# Patient Record
Sex: FEMALE | Race: WHITE | NOT HISPANIC OR LATINO | Employment: OTHER | URBAN - METROPOLITAN AREA
[De-identification: names, ages, dates, MRNs, and addresses within clinical notes are randomized per-mention and may not be internally consistent; named-entity substitution may affect disease eponyms.]

---

## 2018-03-22 ENCOUNTER — OFFICE VISIT CONVERTED (OUTPATIENT)
Dept: OTHER | Facility: HOSPITAL | Age: 68
End: 2018-03-22
Attending: INTERNAL MEDICINE

## 2018-04-19 ENCOUNTER — OFFICE VISIT CONVERTED (OUTPATIENT)
Dept: OTHER | Facility: HOSPITAL | Age: 68
End: 2018-04-19
Attending: INTERNAL MEDICINE

## 2018-05-17 ENCOUNTER — OFFICE VISIT CONVERTED (OUTPATIENT)
Dept: OTHER | Facility: HOSPITAL | Age: 68
End: 2018-05-17
Attending: INTERNAL MEDICINE

## 2018-06-14 ENCOUNTER — OFFICE VISIT CONVERTED (OUTPATIENT)
Dept: OTHER | Facility: HOSPITAL | Age: 68
End: 2018-06-14
Attending: INTERNAL MEDICINE

## 2018-07-12 ENCOUNTER — OFFICE VISIT CONVERTED (OUTPATIENT)
Dept: OTHER | Facility: HOSPITAL | Age: 68
End: 2018-07-12
Attending: INTERNAL MEDICINE

## 2018-08-09 ENCOUNTER — OFFICE VISIT CONVERTED (OUTPATIENT)
Dept: OTHER | Facility: HOSPITAL | Age: 68
End: 2018-08-09
Attending: INTERNAL MEDICINE

## 2018-09-20 ENCOUNTER — OFFICE VISIT CONVERTED (OUTPATIENT)
Dept: OTHER | Facility: HOSPITAL | Age: 68
End: 2018-09-20
Attending: INTERNAL MEDICINE

## 2018-10-25 ENCOUNTER — CONVERSION ENCOUNTER (OUTPATIENT)
Dept: OTHER | Facility: HOSPITAL | Age: 68
End: 2018-10-25

## 2018-10-25 ENCOUNTER — OFFICE VISIT CONVERTED (OUTPATIENT)
Dept: OTHER | Facility: HOSPITAL | Age: 68
End: 2018-10-25
Attending: INTERNAL MEDICINE

## 2018-11-29 ENCOUNTER — CONVERSION ENCOUNTER (OUTPATIENT)
Dept: OTHER | Facility: HOSPITAL | Age: 68
End: 2018-11-29

## 2018-12-20 ENCOUNTER — CONVERSION ENCOUNTER (OUTPATIENT)
Dept: OTHER | Facility: HOSPITAL | Age: 68
End: 2018-12-20

## 2019-01-17 ENCOUNTER — CONVERSION ENCOUNTER (OUTPATIENT)
Dept: OTHER | Facility: HOSPITAL | Age: 69
End: 2019-01-17

## 2019-02-06 ENCOUNTER — HOSPITAL ENCOUNTER (OUTPATIENT)
Dept: RADIATION ONCOLOGY | Facility: HOSPITAL | Age: 69
Discharge: HOME | End: 2019-02-06
Attending: RADIOLOGY

## 2019-02-14 ENCOUNTER — CONVERSION ENCOUNTER (OUTPATIENT)
Dept: OTHER | Facility: HOSPITAL | Age: 69
End: 2019-02-14

## 2019-03-14 ENCOUNTER — CONVERSION ENCOUNTER (OUTPATIENT)
Dept: OTHER | Facility: HOSPITAL | Age: 69
End: 2019-03-14

## 2019-04-11 ENCOUNTER — CONVERSION ENCOUNTER (OUTPATIENT)
Dept: OTHER | Facility: HOSPITAL | Age: 69
End: 2019-04-11

## 2019-04-25 ENCOUNTER — OFFICE VISIT CONVERTED (OUTPATIENT)
Dept: OTHER | Facility: HOSPITAL | Age: 69
End: 2019-04-25
Attending: INTERNAL MEDICINE

## 2019-05-16 ENCOUNTER — OFFICE VISIT CONVERTED (OUTPATIENT)
Dept: OTHER | Facility: HOSPITAL | Age: 69
End: 2019-05-16
Attending: INTERNAL MEDICINE

## 2019-06-13 ENCOUNTER — CONVERSION ENCOUNTER (OUTPATIENT)
Dept: OTHER | Facility: HOSPITAL | Age: 69
End: 2019-06-13

## 2019-07-18 ENCOUNTER — CONVERSION ENCOUNTER (OUTPATIENT)
Dept: OTHER | Facility: HOSPITAL | Age: 69
End: 2019-07-18

## 2019-08-14 ENCOUNTER — HOSPITAL ENCOUNTER (OUTPATIENT)
Dept: RADIATION ONCOLOGY | Facility: HOSPITAL | Age: 69
Discharge: HOME | End: 2019-08-14
Attending: RADIOLOGY

## 2019-08-15 ENCOUNTER — CONVERSION ENCOUNTER (OUTPATIENT)
Dept: OTHER | Facility: HOSPITAL | Age: 69
End: 2019-08-15

## 2019-09-12 ENCOUNTER — CONVERSION ENCOUNTER (OUTPATIENT)
Dept: OTHER | Facility: HOSPITAL | Age: 69
End: 2019-09-12

## 2019-10-10 ENCOUNTER — CONVERSION ENCOUNTER (OUTPATIENT)
Dept: OTHER | Facility: HOSPITAL | Age: 69
End: 2019-10-10

## 2019-11-07 ENCOUNTER — CONVERSION ENCOUNTER (OUTPATIENT)
Dept: OTHER | Facility: HOSPITAL | Age: 69
End: 2019-11-07

## 2019-12-05 ENCOUNTER — OFFICE VISIT CONVERTED (OUTPATIENT)
Dept: OTHER | Facility: HOSPITAL | Age: 69
End: 2019-12-05
Attending: INTERNAL MEDICINE

## 2020-01-09 ENCOUNTER — OFFICE VISIT CONVERTED (OUTPATIENT)
Dept: OTHER | Facility: HOSPITAL | Age: 70
End: 2020-01-09
Attending: INTERNAL MEDICINE

## 2020-02-06 ENCOUNTER — OFFICE VISIT CONVERTED (OUTPATIENT)
Dept: OTHER | Facility: HOSPITAL | Age: 70
End: 2020-02-06
Attending: INTERNAL MEDICINE

## 2020-03-05 ENCOUNTER — OFFICE VISIT CONVERTED (OUTPATIENT)
Dept: OTHER | Facility: HOSPITAL | Age: 70
End: 2020-03-05
Attending: INTERNAL MEDICINE

## 2020-04-02 ENCOUNTER — OFFICE VISIT CONVERTED (OUTPATIENT)
Dept: OTHER | Facility: HOSPITAL | Age: 70
End: 2020-04-02
Attending: INTERNAL MEDICINE

## 2020-04-30 ENCOUNTER — OFFICE VISIT CONVERTED (OUTPATIENT)
Dept: OTHER | Facility: HOSPITAL | Age: 70
End: 2020-04-30
Attending: INTERNAL MEDICINE

## 2020-06-25 ENCOUNTER — OFFICE VISIT CONVERTED (OUTPATIENT)
Dept: OTHER | Facility: HOSPITAL | Age: 70
End: 2020-06-25
Attending: INTERNAL MEDICINE

## 2020-07-23 ENCOUNTER — OFFICE VISIT CONVERTED (OUTPATIENT)
Dept: OTHER | Facility: HOSPITAL | Age: 70
End: 2020-07-23
Attending: INTERNAL MEDICINE

## 2020-08-20 ENCOUNTER — OFFICE VISIT CONVERTED (OUTPATIENT)
Dept: OTHER | Facility: HOSPITAL | Age: 70
End: 2020-08-20
Attending: INTERNAL MEDICINE

## 2020-09-25 ENCOUNTER — HOSPITAL ENCOUNTER (OUTPATIENT)
Dept: RADIATION ONCOLOGY | Facility: HOSPITAL | Age: 70
Discharge: HOME | End: 2020-09-25
Attending: RADIOLOGY

## 2021-01-28 ENCOUNTER — OFFICE VISIT CONVERTED (OUTPATIENT)
Dept: NEUROLOGY | Facility: CLINIC | Age: 71
End: 2021-01-28
Attending: PSYCHIATRY & NEUROLOGY

## 2021-04-29 ENCOUNTER — OFFICE VISIT CONVERTED (OUTPATIENT)
Dept: NEUROLOGY | Facility: CLINIC | Age: 71
End: 2021-04-29
Attending: PSYCHIATRY & NEUROLOGY

## 2021-04-29 ENCOUNTER — CONVERSION ENCOUNTER (OUTPATIENT)
Dept: NEUROLOGY | Facility: CLINIC | Age: 71
End: 2021-04-29

## 2021-05-10 NOTE — H&P
History and Physical      Patient Name: Sweetie Huitron   Patient ID: [id number]   Sex: Female   YOB: 1950    Referring Provider: Do MOLINA    Visit Date: January 28, 2021    Provider: Geoff Gillis MD   [place] [facility]   Location Address: [facility] [address]  [address]  [place], [place]  [id number]   Location Phone: [phone]          Chief Complaint     New patient presenting with memory loss       History Of Present Illness  Sweetie Huitron is a 71 year old /White female who presents today to [facility] today referred from Do MOLINA.      71-year-old woman evaluated for memory loss and evaluate for dementia.  Her sister is with her today.  The patient thinks that there is nothing wrong with her that she is just getting older.  Her sister states that she is different this [place] and she was last [place].  She has seen a deterioration in her ability to keep up with activities of daily living.  She cannot keep up her bills.  Her home is [address] where she had an her father live.  She is independent with activities of daily living and she buys food for her and her father however she also likes to buy food for parties.  She is buying food that expires before the party.  She is over drawing her bank account.  She has her own account and her father is different by her account.  She buys presents for aunts and uncles that does not need gifts.  Her sister states that she buys [place] decorations however the home was dirty.  She decorated a dirty home.  She has not cleaned up her home and she now wants to decorate for [place] Day.    The patient has gotten lost driving in the past.  According to the sister patient repeats herself sometimes more than others.  She will tell the sister the same stories 3-4 times.  She mostly forgets times and dates.  At times she thinks her dreams are real and wakes her father up for this  dreams.    Recently she was started on Aricept.  There is a family history of Alzheimer's disease in her mother.  She lives with her dad who is 92 years old.  She has 1 sister Carin who is with her today.    She has had an MRI of the brain showing nonspecific white matter changes.  She is getting B12 injections.       Past Medical History  Cancer; Memory change; Memory loss         Past Surgical History  Cesarian Section         Medication List  Aricept 5 mg oral tablet         Allergy List  NO KNOWN DRUG ALLERGIES         Family Medical History  Family history of Arthritis; Family history of stroke; Family history of heart disease         Social History  Tobacco (Never)         Review of Systems  · Constitutional  o Denies  o : chills, excessive sweating, fatigue, fever, sycope/passing out, weight gain, weight loss  · Eyes  o Denies  o : changes in vision, blurred vision, double vision  · HENT  o Admits  o : seasonal allergies  o Denies  o : hearing loss, ringing in the ears, ear aches, sore throat, nasal congestion, sinus pain, nose bleeds  · Cardiovascular  o Denies  o : blood clots, swollen legs, anemia, easy burising or bleeding, transfusions  · Respiratory  o Admits  o : dry cough  o Denies  o : shortness of breath, productive cough, pneumonia, COPD  · Gastrointestinal  o Denies  o : dysphagia, reflux  · Genitourinary  o Denies  o : incontinence  · Neurologic  o Denies  o : headache, seizure, stroke, tremor, loss of balance, falls, dizziness/vertigo, difficulty with sleep, numbness/tingling/paresthesia , difficulty with coordination, difficulty with dexterity, weakness  · Musculoskeletal  o Denies  o : neck stiffness/pain, swollen lymph nodes, muscle aches, joint pain, weakness, spasms, sciatica, pain radiating in arm, pain radiating in leg, low back pain  · Endocrine  o Denies  o : diabetes, thyroid disorder  · Psychiatric  o Denies  o : anxiety, depression      Vitals  Date Time BP Position Site L\R Cuff  "Size HR RR TEMP (F) WT  HT  BMI kg/m2 BSA m2 O2 Sat FR L/min FiO2 HC       01/28/2021 10:32 /96 Sitting    86 - R   110lbs 4oz 4'  11.7\" 21.75 1.45             Physical Examination     She is alert, fluent, phasic, follows commands well.  Her Pennington score was 12 out of 30.  Visuospatial/executive 2 out of 5, naming 1 out of 3, attention 0 out of 2, 1 out of 1, 0 out of 3, language 2 out of 2 and 0 out of 1, abstraction 0 out of 2, delayed recall 0 out of 5, orientation 6 out of 6.  Her Mini-Mental status score is 28 out of 30.  She missed 1 out of 5 spelling world backwards, pentagons.  Her clock drawing was impaired initially and she corrected herself.  The second clock drawing numbers were not in the correct position but the time was correct.  She was able to give me political figures correctly.  She can tell me what she ate for dinner last night with her sister.  She told me however her sister lives in [facility] however according to the sister she lives in [place].  The patient was denying that there is anything going on with her when her sister was giving the history.    Optic disks are normal bilaterally, visual fields are full, EOMs are full directions gaze, facial strength is full, soft palate elevation and tongue are normal.  There is no weakness of the upper or lower extremities and with muscle testing.  Fine finger movements are intact.  Reflexes are symmetrically decreased in the biceps, triceps, patellar's and ankles per cerebellar testing is intact.  On [place] gait she is able to tiptoe, heel walk, neftali without any difficulty.  Heart is regular rhythm normal rate           Assessment  · Alzheimer's Disease       Alzheimer's disease, unspecified     331.0/G30.9  Dementia in other diseases classified elsewhere without behavioral disturbance     331.0/F02.80  I discussed with her and her sister that she has mild Alzheimer's disease. I discussed with them that she needs to have a " 's evaluation in [place]. I discussed with them in detail regarding the outcomes of the 's evaluation. I discussed with her that I would recommend for her not to drive without taking his 's evaluation. She is at risk for accidents with visual spatial deficits. She and her sister state that they will think about it.    I will increase her Aricept to 10 mg daily after 1 month of taking 5 mg. Explained to them the adverse effects of the medication. I discussed with them that there is no cure for Alzheimer's disease and treatment is care dependent. She has 2 children living near her. I discussed with the sister about supervising the patient.    The sister also had questions regarding prevention of Alzheimer's disease. I discussed with her regarding decreasing the vascular risk factors. I discussed with her that there is no cure for Alzheimer's disease and the treatment is symptomatic and at best disappointing efficacy.    I will see the patient again in 3 months time.    Total time spent with patient coordinating patient care was 60 minutes        Plan  · Medications  o donepezil 10 mg oral tablet   SIG: take 1 tablet (10 mg) by oral route once daily in the evening after completing 5 mg daily for 1 month   DISP: (30) Tablet with 6 refills  Prescribed on 01/28/2021     o Medications have been Reconciled  o Transition of Care or Provider Policy  · Instructions  o Encouraged to follow-up with Primary Care Provider for preventative care.            Electronically Signed by: Geoff Gillis MD -Author on January 28, 2021 12:42:58 PM

## 2021-05-14 VITALS
HEIGHT: 59 IN | WEIGHT: 110.25 LBS | HEART RATE: 86 BPM | DIASTOLIC BLOOD PRESSURE: 96 MMHG | SYSTOLIC BLOOD PRESSURE: 123 MMHG | BODY MASS INDEX: 22.23 KG/M2

## 2021-05-14 VITALS — BODY MASS INDEX: 22.18 KG/M2 | HEART RATE: 67 BPM | WEIGHT: 110 LBS | HEIGHT: 59 IN

## 2021-05-14 NOTE — PROGRESS NOTES
Progress Note      Patient Name: Sweetie Huitron   Patient ID: [id number]   Sex: Female   YOB: 1950    Referring Provider: Do MOLINA    Visit Date: 2021    Provider: Geoff Gillis MD   [place] [facility]   Location Address: [facility] [email or web address]  [place], [place]  [id number]   Location Phone: [phone]          Chief Complaint  · Follow Up      History Of Present Illness  Sweetie Huitron is a 71 year old /White female who presents today to [facility] today referred from Do MOLINA.      71-year-old woman here for follow-up ER Alzheimer's disease. She did not get the driving evaluation. She is here today with her sister. She and her father think there is nothing wrong with her. She is driving around [facility] AGAINST MEDICAL ADVICE. Her sister tells me that she has delusions. She dreams that her family tells her that her son has  and she called the , please and  home. Eventually she realized that it was not real. There is other times that she has delusions but not hallucinations. She does have any visual or auditory hallucinations. She thinks that the donepezil is not helping at all. She has no adverse effects from the medication.       Past Medical History  Cancer; Memory change; Memory loss         Past Surgical History  Cesarian Section         Medication List  donepezil 10 mg oral tablet         Allergy List  NO KNOWN DRUG ALLERGIES       Allergies Reconciled  Family Medical History  Family history of Arthritis; Family history of stroke; Family history of heart disease         Social History  Tobacco (Never)         Review of Systems  · Constitutional  o Denies  o : chills, excessive sweating, fatigue, fever, sycope/passing out, weight gain, weight loss  · Eyes  o Denies  o : changes in vision, blurry vision, double vision  · HENT  o Denies  o : loss of hearing, ringing in the  "ears, ear aches, sore throat, nasal congestion, sinus pain, nose bleeds, seasonal allergies  · Cardiovascular  o Denies  o : blood clots, swollen legs, anemia, easy burising or bleeding, transfusions  · Respiratory  o Denies  o : shortness of breath, dry cough, productive cough, pneumonia, COPD  · Gastrointestinal  o Denies  o : difficulty swallowing, reflux  · Genitourinary  o Denies  o : incontinence  · Neurologic  o Admits  o : difficulty with sleep  o Denies  o : headache, seizure, stroke, tremor, loss of balance, falls, dizziness/vertigo, difficulty with coordination, difficulty with dexterity, weakness  · Musculoskeletal  o Denies  o : neck stiffness/pain, swollen lymph nodes, muscle aches, joint pain, weakness, spasms, sciatica, pain radiating in arm, pain radiating in leg, low back pain  · Endocrine  o Denies  o : diabetes, thyroid disorder  · Psychiatric  o Denies  o : anxiety, depression      Vitals  Date Time BP Position Site L\R Cuff Size HR RR TEMP (F) WT  HT  BMI kg/m2 BSA m2 O2 Sat FR L/min FiO2        04/29/2021 03:26 /0 Sitting    67 - R   110lbs 0oz 4'  11\" 22.22 1.44             Physical Examination     She is alert, fluent, phasic, follows commands well. Her Mini-Mental status score is 27 out of 30. She missed a month, season, town. She was not sure if she was in [place] or [place]. Immediate memory was 2 out of 3, recent memory 3 out of 3.  Clock drawing is significantly impaired.  She placed 11 numbers on half of the clock then realized that she got it to close and then she repeated it and it was still wrong spacing.  She reversed the short hand in the long hand.  She can tell me the president [place] and the .  Heart is regular in rhythm normal in rate.           Assessment  · Alzheimer's Disease       Alzheimer's disease, unspecified     331.0/G30.9  Dementia in other diseases classified elsewhere without behavioral disturbance     331.0/F02.80  I " discussed with her and her sister that she has mild Alzheimer's disease and that I would recommend for her not to drive unless she takes a driving evaluation. She has significant apraxia. She is to continue taking donepezil.    The sister wanted to know if she can take prevention, coconut oil or any other medications for Alzheimer's disease. I discussed with them that there are no medications that are new for Alzheimer's disease and there is no cure for Alzheimer's disease. Over-the-counter medications are not helpful.    I will see again in 8 months time for follow-up.    Total time spent with patient coordinating patient care was 25 minutes.      Plan  · Medications  o Medications have been Reconciled  o Transition of Care or Provider Policy  · Instructions  o Encouraged to follow-up with Primary Care Provider for preventative care.            Electronically Signed by: Geoff Gillis MD -Author on April 29, 2021 04:09:26 PM

## 2021-05-28 VITALS
BODY MASS INDEX: 20.92 KG/M2 | HEART RATE: 67 BPM | BODY MASS INDEX: 22.2 KG/M2 | OXYGEN SATURATION: 100 % | RESPIRATION RATE: 17 BRPM | TEMPERATURE: 97.3 F | TEMPERATURE: 96.9 F | WEIGHT: 120.06 LBS | WEIGHT: 123.7 LBS | HEART RATE: 85 BPM | WEIGHT: 124.8 LBS | BODY MASS INDEX: 24.58 KG/M2 | OXYGEN SATURATION: 96 % | HEART RATE: 77 BPM | DIASTOLIC BLOOD PRESSURE: 64 MMHG | HEIGHT: 61 IN | HEIGHT: 65 IN | BODY MASS INDEX: 23.57 KG/M2 | WEIGHT: 106.56 LBS | OXYGEN SATURATION: 99 % | HEIGHT: 60 IN | OXYGEN SATURATION: 99 % | SYSTOLIC BLOOD PRESSURE: 139 MMHG | BODY MASS INDEX: 23.67 KG/M2 | DIASTOLIC BLOOD PRESSURE: 77 MMHG | OXYGEN SATURATION: 98 % | RESPIRATION RATE: 16 BRPM | WEIGHT: 121.8 LBS | TEMPERATURE: 97.2 F | HEART RATE: 53 BPM | HEART RATE: 71 BPM | SYSTOLIC BLOOD PRESSURE: 120 MMHG | HEART RATE: 68 BPM | WEIGHT: 123.19 LBS | BODY MASS INDEX: 18.43 KG/M2 | RESPIRATION RATE: 16 BRPM | WEIGHT: 122.4 LBS | DIASTOLIC BLOOD PRESSURE: 76 MMHG | DIASTOLIC BLOOD PRESSURE: 76 MMHG | HEART RATE: 75 BPM | DIASTOLIC BLOOD PRESSURE: 70 MMHG | SYSTOLIC BLOOD PRESSURE: 124 MMHG | TEMPERATURE: 97.3 F | OXYGEN SATURATION: 96 % | HEIGHT: 65 IN | DIASTOLIC BLOOD PRESSURE: 83 MMHG | DIASTOLIC BLOOD PRESSURE: 80 MMHG | OXYGEN SATURATION: 99 % | BODY MASS INDEX: 24.64 KG/M2 | OXYGEN SATURATION: 98 % | BODY MASS INDEX: 20.86 KG/M2 | TEMPERATURE: 97.7 F | OXYGEN SATURATION: 98 % | DIASTOLIC BLOOD PRESSURE: 86 MMHG | DIASTOLIC BLOOD PRESSURE: 76 MMHG | WEIGHT: 106.25 LBS | OXYGEN SATURATION: 98 % | DIASTOLIC BLOOD PRESSURE: 88 MMHG | RESPIRATION RATE: 20 BRPM | BODY MASS INDEX: 20.86 KG/M2 | TEMPERATURE: 98.1 F | RESPIRATION RATE: 18 BRPM | SYSTOLIC BLOOD PRESSURE: 167 MMHG | TEMPERATURE: 96.9 F | SYSTOLIC BLOOD PRESSURE: 143 MMHG | SYSTOLIC BLOOD PRESSURE: 145 MMHG | HEIGHT: 61 IN | WEIGHT: 110.6 LBS | SYSTOLIC BLOOD PRESSURE: 128 MMHG | OXYGEN SATURATION: 94 % | OXYGEN SATURATION: 97 % | TEMPERATURE: 97.7 F | HEART RATE: 81 BPM | DIASTOLIC BLOOD PRESSURE: 60 MMHG | RESPIRATION RATE: 18 BRPM | SYSTOLIC BLOOD PRESSURE: 128 MMHG | BODY MASS INDEX: 23.01 KG/M2 | WEIGHT: 119.6 LBS | SYSTOLIC BLOOD PRESSURE: 138 MMHG | BODY MASS INDEX: 23 KG/M2 | TEMPERATURE: 96.8 F | DIASTOLIC BLOOD PRESSURE: 72 MMHG | WEIGHT: 125.5 LBS | WEIGHT: 121.2 LBS | RESPIRATION RATE: 18 BRPM | RESPIRATION RATE: 18 BRPM | OXYGEN SATURATION: 100 % | RESPIRATION RATE: 18 BRPM | BODY MASS INDEX: 23.45 KG/M2 | RESPIRATION RATE: 18 BRPM | HEIGHT: 61 IN | SYSTOLIC BLOOD PRESSURE: 120 MMHG | HEART RATE: 68 BPM | BODY MASS INDEX: 23.56 KG/M2 | OXYGEN SATURATION: 99 % | SYSTOLIC BLOOD PRESSURE: 132 MMHG | HEART RATE: 80 BPM | DIASTOLIC BLOOD PRESSURE: 97 MMHG | RESPIRATION RATE: 18 BRPM | SYSTOLIC BLOOD PRESSURE: 110 MMHG | RESPIRATION RATE: 17 BRPM | OXYGEN SATURATION: 98 % | DIASTOLIC BLOOD PRESSURE: 74 MMHG | BODY MASS INDEX: 20.84 KG/M2 | DIASTOLIC BLOOD PRESSURE: 78 MMHG | RESPIRATION RATE: 16 BRPM | BODY MASS INDEX: 24.55 KG/M2 | HEART RATE: 71 BPM | TEMPERATURE: 98.4 F | HEIGHT: 60 IN | WEIGHT: 114.6 LBS | SYSTOLIC BLOOD PRESSURE: 134 MMHG | WEIGHT: 104.25 LBS | HEART RATE: 70 BPM | RESPIRATION RATE: 20 BRPM | DIASTOLIC BLOOD PRESSURE: 60 MMHG | BODY MASS INDEX: 24.29 KG/M2 | HEIGHT: 60 IN | SYSTOLIC BLOOD PRESSURE: 122 MMHG | HEIGHT: 60 IN | WEIGHT: 121.7 LBS | BODY MASS INDEX: 18.36 KG/M2 | RESPIRATION RATE: 18 BRPM | TEMPERATURE: 99.7 F | HEIGHT: 60 IN | WEIGHT: 122.6 LBS | TEMPERATURE: 97.7 F | RESPIRATION RATE: 14 BRPM | HEIGHT: 61 IN | BODY MASS INDEX: 24.03 KG/M2 | OXYGEN SATURATION: 98 % | DIASTOLIC BLOOD PRESSURE: 81 MMHG | TEMPERATURE: 97 F | WEIGHT: 110.2 LBS | SYSTOLIC BLOOD PRESSURE: 140 MMHG | TEMPERATURE: 96.7 F | HEIGHT: 61 IN | TEMPERATURE: 97 F | OXYGEN SATURATION: 98 % | DIASTOLIC BLOOD PRESSURE: 78 MMHG | HEIGHT: 60 IN | SYSTOLIC BLOOD PRESSURE: 118 MMHG | SYSTOLIC BLOOD PRESSURE: 129 MMHG | WEIGHT: 106.25 LBS | TEMPERATURE: 98.5 F | OXYGEN SATURATION: 99 % | OXYGEN SATURATION: 98 % | HEART RATE: 70 BPM | TEMPERATURE: 97.7 F | DIASTOLIC BLOOD PRESSURE: 74 MMHG | WEIGHT: 119.5 LBS | TEMPERATURE: 96.4 F | WEIGHT: 122 LBS | WEIGHT: 125.19 LBS | HEART RATE: 58 BPM | TEMPERATURE: 97.2 F | HEART RATE: 64 BPM | SYSTOLIC BLOOD PRESSURE: 114 MMHG | HEART RATE: 61 BPM | DIASTOLIC BLOOD PRESSURE: 83 MMHG | HEIGHT: 61 IN | TEMPERATURE: 98.3 F | RESPIRATION RATE: 16 BRPM | HEART RATE: 64 BPM | SYSTOLIC BLOOD PRESSURE: 114 MMHG | HEIGHT: 60 IN | SYSTOLIC BLOOD PRESSURE: 142 MMHG | OXYGEN SATURATION: 100 % | DIASTOLIC BLOOD PRESSURE: 74 MMHG | HEIGHT: 60 IN | BODY MASS INDEX: 24.19 KG/M2 | OXYGEN SATURATION: 95 % | HEART RATE: 62 BPM | RESPIRATION RATE: 16 BRPM | TEMPERATURE: 97.8 F | HEIGHT: 60 IN | DIASTOLIC BLOOD PRESSURE: 70 MMHG | HEIGHT: 61 IN | SYSTOLIC BLOOD PRESSURE: 110 MMHG | HEIGHT: 61 IN | DIASTOLIC BLOOD PRESSURE: 82 MMHG | HEART RATE: 68 BPM | WEIGHT: 119.44 LBS | WEIGHT: 125.04 LBS | WEIGHT: 124.37 LBS | HEART RATE: 87 BPM | HEIGHT: 61 IN | WEIGHT: 117.4 LBS | SYSTOLIC BLOOD PRESSURE: 158 MMHG | OXYGEN SATURATION: 98 % | RESPIRATION RATE: 20 BRPM | DIASTOLIC BLOOD PRESSURE: 80 MMHG | RESPIRATION RATE: 18 BRPM | SYSTOLIC BLOOD PRESSURE: 129 MMHG | DIASTOLIC BLOOD PRESSURE: 75 MMHG | BODY MASS INDEX: 22.55 KG/M2 | TEMPERATURE: 97.9 F | OXYGEN SATURATION: 100 % | BODY MASS INDEX: 20.88 KG/M2 | HEART RATE: 75 BPM | SYSTOLIC BLOOD PRESSURE: 140 MMHG | WEIGHT: 122 LBS | OXYGEN SATURATION: 99 % | SYSTOLIC BLOOD PRESSURE: 121 MMHG | DIASTOLIC BLOOD PRESSURE: 71 MMHG | HEIGHT: 60 IN | SYSTOLIC BLOOD PRESSURE: 150 MMHG | BODY MASS INDEX: 23.83 KG/M2 | RESPIRATION RATE: 18 BRPM | HEIGHT: 60 IN | TEMPERATURE: 97.5 F | SYSTOLIC BLOOD PRESSURE: 110 MMHG | HEART RATE: 72 BPM | BODY MASS INDEX: 22.16 KG/M2 | DIASTOLIC BLOOD PRESSURE: 78 MMHG | TEMPERATURE: 97.1 F | SYSTOLIC BLOOD PRESSURE: 124 MMHG | HEIGHT: 60 IN | OXYGEN SATURATION: 97 % | HEART RATE: 60 BPM | HEART RATE: 73 BPM | RESPIRATION RATE: 18 BRPM | BODY MASS INDEX: 21.64 KG/M2 | BODY MASS INDEX: 22.56 KG/M2 | RESPIRATION RATE: 13 BRPM | BODY MASS INDEX: 24.29 KG/M2 | HEIGHT: 60 IN | TEMPERATURE: 98 F | RESPIRATION RATE: 20 BRPM | BODY MASS INDEX: 24.07 KG/M2 | HEART RATE: 65 BPM | TEMPERATURE: 98.2 F | HEIGHT: 60 IN | SYSTOLIC BLOOD PRESSURE: 138 MMHG | HEART RATE: 58 BPM | HEART RATE: 74 BPM | SYSTOLIC BLOOD PRESSURE: 130 MMHG | BODY MASS INDEX: 23.89 KG/M2 | WEIGHT: 117.19 LBS | HEART RATE: 67 BPM | WEIGHT: 110.37 LBS | BODY MASS INDEX: 23.95 KG/M2 | HEIGHT: 60 IN | DIASTOLIC BLOOD PRESSURE: 62 MMHG | BODY MASS INDEX: 19.68 KG/M2 | DIASTOLIC BLOOD PRESSURE: 79 MMHG | WEIGHT: 113.06 LBS | HEART RATE: 69 BPM | OXYGEN SATURATION: 98 % | OXYGEN SATURATION: 98 % | WEIGHT: 110.6 LBS | DIASTOLIC BLOOD PRESSURE: 72 MMHG | HEART RATE: 60 BPM | HEIGHT: 60 IN | OXYGEN SATURATION: 94 % | BODY MASS INDEX: 23.36 KG/M2 | OXYGEN SATURATION: 99 % | TEMPERATURE: 97.8 F | TEMPERATURE: 98.6 F

## 2021-05-28 NOTE — PROGRESS NOTES
Patient: TEETEE HARRISON     Acct: [id number]     Report: #[id number]  UNIT #: [id number]     : 1950    Encounter Date:2020  PRIMARY CARE:   ***Signed***  --------------------------------------------------------------------------------------------------------------------  DATE: 20      Primary Care Provider      Primary Care Provider:  PHIL PETTY            Referring Physician      Referring Provider not in look:        Martín Rose M.D.            Chief Complaint      FU (L) Breast Cancer            Subjective      69-year-old white female with history of left breast carcinoma presenting with a    fungating ulcerated mass as well as pleural effusion.  Patient received     neoadjuvant chemotherapy with Adriamycin and Cytoxan x3 only because of the     patient's inability to tolerate any further treatments.  Patient received     radiation therapy and Faslodex with complete disappearance of her breast mass     and pleural effusion.            Patient is doing well            Past Med/Surg History            Past Med/Surg History:   No Hypertension             No Diabetes Mellitus             No Heart Disease             No Blood Clots             Cancer (Left breast cancer status post chemotherapy, radiation therapy,     hormonal therapy.)             No Lung Disease             No Kidney Disease             No Other            Social History      Social History:  No Tobacco Use, No Alcohol Use, No Recreational Drug use, No     Other            Allergies      Coded Allergies:             SULFA (SULFONAMIDE ANTIBIOTICS) (Verified  Allergy, Unknown, 18)           SULFAMETHOXAZOLE (Verified  Allergy, Unknown, 18)           TRIMETHOPRIM (Verified  Allergy, Unknown, 18)            Medications      Medications    Last Reconciled on 20 17:51 by RANDY CAMERON MD      Calcium Carbonate/Vitamin D3 (OYSCO 500-VIT D3 200 TABLET) 1 Each Tablet      2 EACH PO QDAY, TABLET          Reported         4/2/20      Current Medications      Current Medications Reviewed 4/2/20            Pain Assessment      Pain Intensity:  0      Description:  None            Review of Systems      General:  No Anxiety; Fatigue Scale: (0), Pain Scale: (0)      HEENT:  No Dysphagia, No Hearing Changes      Respiratory:  No Cough      Cardiovascular:  No Chest Pain, No Pedal Edema      Gastrointestinal:  No Nausea; Appetite Good (Good)      Genitourinary:  No Nocturia      Musculoskeletal:  No Joint Effusions, No Joint Tenderness      Endocrine:  No Heat Intolerance      Hematologic:  No Bleeding      Allergic/Immunologic:  No Hives      Psychological:  No Anxiety, No Depression      Neurological:  No Headaches      Skin:  No Rash, No Open Wounds            Vitals      Height 5 ft 0.5 in / 153.67 cm            Exam      Constitutional:  No acute distress, Conversant, Pleasant      Eyes:  Anicteric sclerae, Palpebral Conjunctivae (Pink), MONIKA      Neck:  Supple, Full Range of Motion      Cardiovascular:  RRR; No Murmurs; Normal PMI; No Peripheral Edema      Lungs:  Clear to Ausculation, Normal Respiratory Effort      Abdomen:  Soft; No Masses, No Tenderness      Chest:  Other (Symmetrical and equal)      Lymphatic:  No Neck, No Axillae      Extremities:  No digital cyanosis, No digital ischemia, Normal gait, Other (No     deformity)      Neurological:  Cranial Nerve II-XII (Intact); No Focal Sensory deficits      Psychological:  Appropriate affect, Appropriate mood, Intact judgement, Alert      Skin:  Normal temperature, Normal tone, Other (No dermatosis)            Impression/Problem List      Inflammatory breast carcinoma, left side with pleural effusion status post     neoadjuvant chemotherapy and radiation therapy presently on Faslodex.       Osteoporosis      Notes      New Medications      * Calcium Carbonate/Vitamin D3 (OYSCO 500-VIT D3 200 TABLET) 1 EACH TABLET: 2       EACH PO QDAY            Plan      CBC,  CMP, CA-27-29 in 1 month      Continue Faslodex 500 mg IM every month.  Patient refuses Ibrance or similar     drugs.      Calcium with vitamin D2 times a day      Prolia 60 mcg subcu every 6 months            Patient Education:        Breast Cancer in Women            PREVENTION      Hx Influenza Vaccination:  No      Influenza Vaccine Declined:  Yes      2 or More Falls Past Year?:  No      Fall Past Year with Injury?:  No      Encouraged to follow-up with:  PCP regarding preventative exams.      Chart initiated by      MORGAN Chawla MA            Electronically signed by Kayla Guerrero  04/02/2020 17:51       Disclaimer: Converted document may not contain table formatting or lab diagrams. Please see [employer] for the authenticated document.

## 2021-05-28 NOTE — PROGRESS NOTES
Patient: TEETEE HARRISON     Acct: [id number]     Report: #[id number]  UNIT #: [id number]     : 1950    Encounter Date:2018  PRIMARY CARE:   ***Signed***  --------------------------------------------------------------------------------------------------------------------  DATE: 18      Primary Care Provider      Primary Care Provider:  IVAN PARADA            Referring Physician      Referring Provider not in look:        Martín Rose M.D.            Chief Complaint      Follow up Left Breast Cancer/ Chemo            Subjective      68-year-old white female has a history of left breast carcinoma inflammatory     type that has metastasized to the mediastinum , left axilla      right axilla,pleura and lymphadenopathy in the left crura of the hemidiaphragm.      Patient initially received chemotherapy consisting of Adriamycin and Cytoxan     followed by Faslodex.  Patient also received radiation therapy to her left     breast which finished on 2018.            Patient denies any symptoms.  She's been gaining weight; her appetite is good.            Past Med/Surg History            Past Med/Surg History:   No Hypertension             No Diabetes Mellitus             No Heart Disease             No Blood Clots             Cancer             No Lung Disease             No Kidney Disease             No Other            Social History      Social History:  No Tobacco Use, No Alcohol Use, No Recreational Drug use, No     Other            Allergies      Coded Allergies:             SULFA (SULFONAMIDE ANTIBIOTICS) (Verified  Allergy, Unknown, 18)            Medications      Medications    Last Reconciled on 18 18:24 by RANDY CAMERON MD      Fulvestrant (Faslodex) 250 Mg/5 Ml Syringe      500 MG IM ONCE, SYRINGE         Reported         3/22/18      Current Medications      Current Medications Reviewed 18            Pain Assessment      Pain Intensity:  0      Description:  None             Review of Systems      General:  No Anxiety, No Fatigue Scale:, No Pain Scale:, No Fever, No Other      HEENT:  No Dysphagia, No Hearing Changes, No Rhinorrhea, No Tinnitus, No Visual     Changes, No Nasal Congestion, No Epistaxis, No Other      Respiratory:  No Cough, No Shortness of Air, No Sputum Changes, No Wheezing, No     Hemoptysis, No Congestion, No Other      Cardiovascular:  No Chest Pain, No Pedal Edema, No Orthopnea, No Palpitations,     No Chest Pressure, No Dizziness, No Other      Gastrointestinal:  No Nausea, No Vomiting, No Dysphagia, No Constipation, No     Diarrhea, Appetite Good, No Appetite Fair, No Appetite Poor, No Early Satiety,     No Other      Genitourinary:  No Nocturia, No Dysuria, No Other      Musculoskeletal:  No Joint Effusions, No Joint Tenderness, No Joint Stiffness,     No Myalgias, No Aches, No Pains, No Other      Endocrine:  No Heat Intolerance, No Cold Intolerance, No Fatigue, No Blood     Sugar Control, No Other      Hematologic:  No Bleeding, No Bruising, No Swollen Glands, No Other      Allergic/Immunologic:  No Hives, No Throat closing off, No Nasal drip, No Itchy     eyes, No Hay fever, No Other      Psychological:  No Anxiety, No Depression, No Other      Neurological:  No Headaches, No Dizziness, No Weakness, No Numbness, No Other      Skin:  No Rash, No Open Wounds, No Edema, No Other            Vitals      Height 5 ft 0 in / 152.4 cm      Weight 106 lbs 4 oz / 48.685056 kg      BSA 1.43 m2      BMI 20.8 kg/m2      Temperature 97.5 F / 36.39 C - Temporal      Pulse 64      Blood Pressure 121/71 Sitting, Left Arm      Pulse Oximetry 99%, room air            Exam      Constitutional:  No acute distress, Conversant, Pleasant      Eyes:  Anicteric sclerae, Palpebral Conjunctivae (pink), MONIKA      HENT:  Oropharynx clear, No Erythema, Buccal mucosae (pink)      Neck:  No Supple, Full Range of Motion      Cardiovascular:  RRR, No Murmurs, Normal PMI, No  Peripheral Edema      Lungs:  Clear to Ausculation, Normal Respiratory Effort, Other (diminished     breath sounds on the right)      Abdomen:  Soft, NABS, No Tenderness      Chest:  Other (symmetrical and equal)      Breast:  Other (erythema left breast improved)      Lymphatic:  No Neck, No Axillae      Extremities:  No digital cyanosis, Normal gait, Other (no deformity, no     limitation range of motion)      Neurological:  Cranial Nerve II-XII, No Focal Sensory deficits      Psychological:  Appropriate affect, Intact judgement, Alert      Skin:  Normal temperature, Other (no dermatosis except in the breast)            Lab Results      CA-27-29 20 7.      CMP normal            Impression/Problem List      Inflammatory breast carcinoma, left side with pleural effusion status post     chemotherapy and radiation therapy presently on Faslodex            Plan            Continue Faslodex 500 mg IM every month.            Patient Education:        Breast Cancer in Women            PREVENTION      Hx Influenza Vaccination:  No      Influenza Vaccine Declined:  Yes      2 or More Falls Past Year?:  No      Fall Past Year with Injury?:  No      Hx Pneumococcal Vaccination:  No      Encouraged to follow-up with:  PCP regarding preventative exams.      Chart initiated by      Shellie Singleton MA                 Disclaimer: Converted document may not contain table formatting or lab diagrams. Please see [facility] for the authenticated document.

## 2021-05-28 NOTE — PROGRESS NOTES
Patient: TEETEE HARRISON     Acct: [id number]     Report: #[id number]  UNIT #: [id number]     : 1950    Encounter Date:2020  PRIMARY CARE:   ***Signed***  --------------------------------------------------------------------------------------------------------------------  DATE: 20      Primary Care Provider      Primary Care Provider:  PHIL PETTY            Referring Physician      Referring Provider not in look:        Martín Rose M.D.            Chief Complaint      FU (L) Breast Cancer            Subjective      70-year-old female has a history of inflammatory breast carcinoma with pleural     effusion treated with neoadjuvant chemotherapy and radiation therapy.  Patient     had a complete response and there was started on Faslodex monthly.            Patient claims she is doing well.  Her dad is in the hospital presently with     pneumonia.      She is very appreciative of her life.            Past Med/Surg History            Past Med/Surg History:   No Hypertension             No Diabetes Mellitus             No Heart Disease             No Blood Clots             Cancer (Left breast cancer status post chemotherapy, radiation therapy, hormon    al therapy.)             No Lung Disease             No Kidney Disease             No Other            Social History      Social History:  No Tobacco Use, No Alcohol Use, No Recreational Drug use, No     Other            Allergies      Coded Allergies:             SULFA (SULFONAMIDE ANTIBIOTICS) (Verified  Allergy, Unknown, 18)           SULFAMETHOXAZOLE (Verified  Allergy, Unknown, 18)           TRIMETHOPRIM (Verified  Allergy, Unknown, 18)            Medications      Medications    Last Reconciled on 20 19:14 by RANDY CAMERON MD      No Medication Information Entered            Current Medications      Current Medications Reviewed 20            Pain Assessment      Pain Intensity:  0      Description:  None             Review of Systems      General:  No Anxiety; Fatigue Scale: (0), Pain Scale: (0)      HEENT:  No Dysphagia, No Hearing Changes      Respiratory:  No Cough      Cardiovascular:  No Chest Pain, No Pedal Edema      Gastrointestinal:  No Nausea; Appetite Good (Good)      Genitourinary:  No Nocturia, No Dysuria      Musculoskeletal:  No Joint Effusions      Endocrine:  No Heat Intolerance, No Cold Intolerance      Hematologic:  No Bleeding      Allergic/Immunologic:  No Hives      Psychological:  No Anxiety      Neurological:  No Headaches, No Dizziness      Skin:  No Rash            Vitals      Height 5 ft 5 in / 165.1 cm      Weight 110 lbs 9.6 oz / 50.633235 kg      BSA 1.54 m2      BMI 18.4 kg/m2      Temperature 96.9 F / 36.06 C      Pulse 69      Respirations 20      Blood Pressure 110/64      Pulse Oximetry 98%            Exam      Constitutional:  No acute distress, Conversant, Pleasant      Eyes:  Anicteric sclerae, Palpebral Conjunctivae (Pink)      Neck:  Supple, Full Range of Motion      Cardiovascular:  RRR; No Murmurs; Normal PMI; No Peripheral Edema      Lungs:  Clear to Ausculation, Normal Respiratory Effort      Abdomen:  Soft; No Masses, No Tenderness      Chest:  Other (Symmetrical and equal)      Breast:  No Masses, No Tenderness, No Drainage      Lymphatic:  No Neck, No Axillae      Extremities:  No digital cyanosis, No digital ischemia, Normal gait, Other (No     deformity)      Neurological:  Cranial Nerve II-XII (Intact); No Focal Sensory deficits      Psychological:  Appropriate affect, Appropriate mood, Intact judgement, Alert      Skin:  Other (No dermatoses)            Lab Results      CMP normal            Impression/Problem List      Inflammatory breast carcinoma, left side with pleural effusion status post     neoadjuvant chemotherapy and radiation therapy presently on Faslodex.       Osteoporosis            Plan            Continue Faslodex 500 mg IM every month.  Patient refuses  Ibrance or similar     drugs.      Calcium with vitamin D2 times a day      Prolia 60 mcg subcu every 6 months            Patient Education:        Breast Cancer in Women            PREVENTION      Hx Influenza Vaccination:  No      Influenza Vaccine Declined:  Yes      2 or More Falls Past Year?:  No      Fall Past Year with Injury?:  No      Encouraged to follow-up with:  PCP regarding preventative exams.      Chart initiated by      MORGAN Chawla MA            Electronically signed by Kayla Guerrero  07/29/2020 19:23       Disclaimer: Converted document may not contain table formatting or lab diagrams. Please see [facility] for the authenticated document.

## 2021-05-28 NOTE — PROGRESS NOTES
Patient: TEETEE HARRISON     Acct: [id number]     Report: #[id number]  UNIT #: [id number]     : 1950    Encounter Date:2020  PRIMARY CARE:   ***Signed***  --------------------------------------------------------------------------------------------------------------------  DATE: 20      Primary Care Provider      Primary Care Provider:  PHIL PETTY            Referring Physician      Referring Provider not in look:        Martín Rose M.D.            Chief Complaint      FU (L) Breast Cancer            Subjective      69-year-old white female was diagnosed in 2017 with left breast carcino    ma that had metastatic metastasized to the left axillary mediastinal lymph     nodes's presence of small left pleural effusion.  Patient had been given     neoadjuvant chemotherapy followed by radiation therapy.  Patient was started on     Faslodex on 2018.  By 2019 evidence of lung metastasis     had resolved on CAT scan.      She does not want to take any pills and prefers to just be on Faslodex.            Past Med/Surg History            Past Med/Surg History:   No Hypertension             No Diabetes Mellitus             No Heart Disease             No Blood Clots             Cancer (Left breast cancer status post chemotherapy, radiation therapy, hor    monal therapy.)             No Lung Disease             No Kidney Disease             No Other            Social History      Social History:  No Tobacco Use, No Alcohol Use, No Recreational Drug use, No     Other            Allergies      Coded Allergies:             SULFA (SULFONAMIDE ANTIBIOTICS) (Verified  Allergy, Unknown, 18)           SULFAMETHOXAZOLE (Verified  Allergy, Unknown, 18)           TRIMETHOPRIM (Verified  Allergy, Unknown, 18)            Medications      Medications    Last Reconciled on 2/10/20 16:50 by RANDY CAMERON MD      No Active Prescriptions or Reported Meds      Current  Medications      Current Medications Reviewed 2/6/20            Pain Assessment      Pain Intensity:  0      Description:  None            Review of Systems      General:  No Anxiety; Fatigue Scale: (0), Pain Scale: (0)      HEENT:  No Dysphagia, No Hearing Changes      Respiratory:  No Cough      Cardiovascular:  No Chest Pain      Gastrointestinal:  No Nausea, No Vomiting; Appetite Good (Good)      Genitourinary:  No Nocturia      Musculoskeletal:  No Joint Effusions      Endocrine:  No Heat Intolerance, No Cold Intolerance      Hematologic:  No Bleeding      Allergic/Immunologic:  No Hives      Psychological:  No Anxiety      Neurological:  No Headaches, No Dizziness      Skin:  No Rash            Vitals      Height 5 ft 0.5 in / 153.67 cm      Weight 119 lbs 8 oz / 54.180491 kg      BSA 1.51 m2      BMI 23.0 kg/m2      Temperature 97.7 F / 36.5 C      Pulse 64      Respirations 20      Blood Pressure 128/62      Pulse Oximetry 100%            Exam      Constitutional:  No acute distress, Conversant, Pleasant      Eyes:  Anicteric sclerae, Palpebral Conjunctivae (Pink), MONIKA      HENT:  Oropharynx clear; No Erythema; Buccal mucosae (Pink)      Neck:  Supple, Full Range of Motion; No Masses      Cardiovascular:  RRR; No Murmurs; Normal PMI; No Peripheral Edema      Lungs:  Clear to Ausculation, Normal Respiratory Effort      Abdomen:  Soft, NABS; No Tenderness      Chest:  Other (Symmetrical)      Lymphatic:  No Neck      Extremities:  No digital cyanosis, No digital ischemia, Pedal pulses intact,     Normal gait, Other (No deformity)      Neurological:  Cranial Nerve II-XII; No Focal Sensory deficits      Psychological:  Appropriate affect, Appropriate mood, Intact judgement, Alert      Skin:  Normal temperature, Normal tone, Other (No dermatosis)            Impression/Problem List      Inflammatory breast carcinoma, left side with pleural effusion status post     neoadjuvant chemotherapy and radiation therapy  presently on Faslodex.            Plan      CBC, CMP in 1 month      Continue Faslodex 500 mg IM every month.            Patient Education:        Breast Cancer in Women            PREVENTION      Hx Influenza Vaccination:  No      Influenza Vaccine Declined:  Yes      2 or More Falls Past Year?:  No      Fall Past Year with Injury?:  No      Encouraged to follow-up with:  PCP regarding preventative exams.      Chart initiated by      MORGAN Chawla MA            Electronically signed by Kayla Guerrero  02/10/2020 16:50       Disclaimer: Converted document may not contain table formatting or lab diagrams. Please see [facility] for the authenticated document.

## 2021-05-28 NOTE — PROGRESS NOTES
Patient: TEETEE HARRISON     Acct: [id number]     Report: #[id number]  UNIT #: [id number]     : 1950    Encounter Date:2018  PRIMARY CARE:   ***Signed***  --------------------------------------------------------------------------------------------------------------------  DATE: 18      Primary Care Provider      Primary Care Provider:  IVAN PARADA            Referring Physician      Referring Provider not in look:        Martín Rose M.D.            Chief Complaint      Follow up Left Breast Cancer            Subjective      68-year-old white female had been diagnosed with inflammatory cell carcinoma     with pleural effusion.  Patient received neoadjuvant chemotherapy with     Adriamycin and Cytoxan.  Prior to the fourth course patient developed     neutropenic C sepsis.  Patient also had incision dependent anemia.            Patient underwent radiation therapy to the left breast.  She was started on     Faslodex.            Patient claims she's exercising no her left breast continues to improve.  Her     left axilla is softer with no lymph nodes palpable            Past Med/Surg History            Past Med/Surg History:   No Hypertension             No Diabetes Mellitus             No Heart Disease             No Blood Clots             Cancer             No Lung Disease             No Kidney Disease             No Other            Social History      Social History:  No Tobacco Use, No Alcohol Use, No Recreational Drug use, No     Other            Allergies      Coded Allergies:             SULFA (SULFONAMIDE ANTIBIOTICS) (Verified  Allergy, Unknown, 18)            Medications      Medications    Last Reconciled on 18 12:56 by MARY SHEETS      No Medication Information Entered            Current Medications      Current Medications Reviewed 18            Pain Assessment      Pain Intensity:  0      Description:  None            Review of Systems      General:   No Anxiety, No Fatigue Scale:, No Pain Scale:, No Fever, No Other      HEENT:  No Dysphagia, No Hearing Changes, No Rhinorrhea, No Tinnitus, No Visual     Changes, No Nasal Congestion, No Epistaxis, No Other      Respiratory:  No Cough, No Shortness of Air, No Sputum Changes, No Wheezing, No     Hemoptysis, No Congestion, No Other      Cardiovascular:  No Chest Pain, No Pedal Edema, No Orthopnea, No Palpitations,     No Chest Pressure, No Dizziness, No Other      Gastrointestinal:  No Nausea, No Vomiting, No Dysphagia, No Constipation, No     Diarrhea, Appetite Good, No Appetite Fair, No Appetite Poor, No Early Satiety,     No Other      Genitourinary:  No Nocturia, No Dysuria, No Other      Musculoskeletal:  No Joint Effusions, No Joint Tenderness, No Joint Stiffness,     No Myalgias, No Aches, No Pains, No Other      Endocrine:  No Heat Intolerance, No Cold Intolerance, No Fatigue, No Blood     Sugar Control, No Other      Hematologic:  No Bleeding, No Bruising, No Swollen Glands, No Other      Allergic/Immunologic:  No Hives, No Throat closing off, No Nasal drip, No Itchy     eyes, No Hay fever, No Other      Psychological:  No Anxiety, No Depression, No Other      Neurological:  No Headaches, No Dizziness, No Weakness, No Numbness, No Other      Skin:  No Rash, No Open Wounds, No Edema, No Other            Vitals      Height 5 ft 0.5 in / 153.67 cm      Weight 110 lbs 6 oz / 50.952595 kg      BSA 1.46 m2      BMI 21.2 kg/m2      Temperature 98.5 F / 36.94 C - Temporal      Pulse 75      Respirations 18      Blood Pressure 122/78 Sitting, Left Arm      Pulse Oximetry 97%, room air            Exam      Constitutional:  No acute distress, Conversant, Pleasant      Eyes:  Anicteric sclerae, Palpebral Conjunctivae (pink), MONIKA      HENT:  Oropharynx clear, No Erythema, Buccal mucosae (pink)      Neck:  Supple, Full Range of Motion      Cardiovascular:  RRR, No Murmurs, Normal PMI, No Peripheral Edema      Lungs:   Clear to Ausculation, Normal Respiratory Effort      Abdomen:  Soft, NABS, No Masses      Chest:  Other (symmetrical and equal)      Breast:  Other (left breast still has some scabbing still some induration     underneath the nipple area)      Lymphatic:  No Neck, Axillae (left axilla no palpable masses)      Extremities:  No digital cyanosis, Normal gait, Other (no deformity no     limitation in range of motion)      Neurological:  Cranial Nerve II-XII (Intact), No Focal Sensory deficits      Psychological:  Appropriate affect, Intact judgement, Alert      Skin:  Other (no dermatosis)            Impression/Problem List      Inflammatory breast carcinoma, left side with pleural effusion status post     chemotherapy and radiation therapy presently on Faslodex            Plan            Continue Faslodex 500 mg IM every month.            Patient Education:        Breast Cancer in Women            PREVENTION      Hx Influenza Vaccination:  No      Influenza Vaccine Declined:  Yes      Hx Pneumococcal Vaccination:  No      Encouraged to follow-up with:  PCP regarding preventative exams.      Chart initiated by      Shellie Singleton MA                 Disclaimer: Converted document may not contain table formatting or lab diagrams. Please see [facility] for the authenticated document.

## 2021-05-28 NOTE — PROGRESS NOTES
Patient: TEETEE HARRISON     Acct: [id number]     Report: #[id number]  UNIT #: [id number]     : 1950    Encounter Date:2018  PRIMARY CARE:   ***Signed***  --------------------------------------------------------------------------------------------------------------------  DATE: 18      Primary Care Provider      Primary Care Provider:  IVAN PARADA            Referring Physician      Referring Provider not in look:        Martín Rose M.D.            Chief Complaint      Follow up Left Breast Cancer            Subjective      68-year-old white female with a history of inflammatory cell carcinoma with     pleural effusion status post neoadjuvant Cytoxan and Adriamycin.  Patient has     been started on Faslodex 500 mg monthly.            Scab came off her left breast and a rashyears which is not irritated.            Past Med/Surg History            Past Med/Surg History:   No Hypertension             No Diabetes Mellitus             No Heart Disease             No Blood Clots             Cancer             No Lung Disease             No Kidney Disease             No Other            Social History      Social History:  No Tobacco Use, No Alcohol Use, No Recreational Drug use, No     Other            Allergies      Coded Allergies:             SULFA (SULFONAMIDE ANTIBIOTICS) (Verified  Allergy, Unknown, 18)            Medications      Medications    Last Reconciled on 18 12:56 by MARY SHEETS      Fulvestrant (Faslodex) 250 Mg/5 Ml Syringe      250 MG IM ONCE, SYRINGE         Reported         18      Current Medications      Current Medications Reviewed 18            Pain Assessment      Pain Intensity:  0      Description:  None            Review of Systems      General:  Anxiety; No Fatigue Scale:, No Pain Scale:, No Fever, No Other      HEENT:  No Dysphagia, No Hearing Changes, No Rhinorrhea, No Tinnitus, No Visual     Changes, No Nasal Congestion, No  Epistaxis, No Other      Respiratory:  No Cough, No Shortness of Air, No Sputum Changes, No Wheezing, No     Hemoptysis, No Congestion, No Other      Cardiovascular:  No Chest Pain, No Pedal Edema, No Orthopnea, No Palpitations,     No Chest Pressure, No Dizziness, No Other      Gastrointestinal:  No Nausea, No Vomiting, No Dysphagia, No Constipation, No     Diarrhea; Appetite Good; No Appetite Fair, No Appetite Poor, No Early Satiety,     No Other      Genitourinary:  No Nocturia, No Dysuria, No Other      Musculoskeletal:  No Joint Effusions, No Joint Tenderness, No Joint Stiffness,     No Myalgias, No Aches, No Pains, No Other      Endocrine:  No Heat Intolerance, No Cold Intolerance, No Fatigue, No Blood Sugar    Control, No Other      Hematologic:  No Bleeding, No Bruising, No Swollen Glands, No Other      Allergic/Immunologic:  No Hives, No Throat closing off, No Nasal drip, No Itchy     eyes, No Hay fever, No Other      Psychological:  No Anxiety, No Depression, No Other      Neurological:  No Headaches, No Dizziness, No Weakness, No Numbness, No Other      Skin:  No Rash, No Open Wounds, No Edema, No Other            Vitals      Height 5 ft 0 in / 152.4 cm      Weight 120 lbs 1 oz / 54.483441 kg      BSA 1.53 m2      BMI 23.4 kg/m2      Temperature 98.6 F / 37 C - Temporal      Pulse 80      Blood Pressure 129/77 Sitting, Left Arm      Pulse Oximetry 98%, room air            Exam      Constitutional:  No acute distress, Conversant, Pleasant      Eyes:  Anicteric sclerae, Palpebral Conjunctivae (Pain), MONIKA      HENT:  Oropharynx clear; No Erythema; Buccal mucosae (Pain)      Neck:  Supple, Full Range of Motion      Cardiovascular:  RRR; No Murmurs; Normal PMI; No Peripheral Edema      Lungs:  Clear to Ausculation, Normal Respiratory Effort      Abdomen:  Soft, NABS; No Tenderness      Chest:  Other (Symmetrical)      Breast:  Other (Left breast beginning to look better, rash noted)      Lymphatic:  No Neck,  No Axillae      Extremities:  No digital cyanosis, No digital ischemia, Normal gait, Other (No     deformity)      Neurological:  Cranial Nerve II-XII; No Focal Sensory deficits      Psychological:  Appropriate affect, Appropriate mood, Intact judgement, Alert      Skin:  Normal temperature, Other            Lab Results      CMP normal, CA-27-29 15.5 last August            Impression/Problem List      Inflammatory breast carcinoma, left side with pleural effusion status post     chemotherapy and radiation therapy presently on Faslodex.  Patient showing     evidence of improvement on the left breast.      Notes      New Medications      * Fulvestrant (Faslodex) 250 MG/5 ML SYRINGE: 250 MG IM ONCE            Plan      CBC, CMP, CA-27-29 in 1 month      Continue Faslodex 500 mg IM every month.            Patient Education:        Breast Cancer in Women            PREVENTION      Hx Influenza Vaccination:  No      Influenza Vaccine Declined:  Yes      2 or More Falls Past Year?:  No      Fall Past Year with Injury?:  No      Hx Pneumococcal Vaccination:  No      Encouraged to follow-up with:  PCP regarding preventative exams.      Chart initiated by      Shellie Singleton MA                 Disclaimer: Converted document may not contain table formatting or lab diagrams. Please see [facility] for the authenticated document.

## 2021-05-28 NOTE — PROGRESS NOTES
Patient: TEETEE HARRISON     Acct: [id number]     Report: #[id number]  UNIT #: [id number]     : 1950    Encounter Date:2018  PRIMARY CARE:   ***Signed***  --------------------------------------------------------------------------------------------------------------------  DATE: 3/22/18      Primary Care Provider      Primary Care Provider:  IVAN PARADA            Referring Physician      Referring Provider not in look:        Martín Rose M.D.            Chief Complaint      Follow up Breast Cancer            Subjective      68-year-old white female has a history of inflammatory breast carcinoma with     pleural effusion status post chemotherapy with Adriamycin and Cytoxan.      Abrasion finish only 3 courses by the fourth course patient was ended up in the     hospital with neutropenic fever with SIRS.  Patient also had to anemia     necessitating blood transfusions.      Patient is on hormonal manipulation with Faslodex and radiation therapy to her     left breast.            Patient is doing well.  Patient claims that she is not tired.            Past Med/Surg History            Past Med/Surg History:   No Hypertension             No Diabetes Mellitus             No Heart Disease             No Blood Clots             Cancer             No Lung Disease             No Kidney Disease             No Other            Social History      Social History:  No Tobacco Use, No Alcohol Use, No Recreational Drug use, No     Other            Allergies      Coded Allergies:             Sulfa (Sulfonamide Antibiotics) (Verified  Allergy, 3/22/18)            Medications      Fulvestrant (Faslodex) 250 Mg/5 Ml Syringe      500 MG IM ONCE, SYRINGE         Reported         3/22/18      Current Medications      Current Medications Reviewed 3/22/18            Pain Assessment      Pain Intensity:  0      Description:  None            Review of Systems      General:  No Anxiety, No Fatigue Scale:, No Pain  Scale:, No Fever, No Other      HEENT:  No Dysphagia, No Hearing Changes, No Rhinorrhea, No Tinnitus, No Visual     Changes, No Nasal Congestion, No Epistaxis, No Other      Respiratory:  No Cough, No Shortness of Air, No Sputum Changes, No Wheezing, No     Hemoptysis, No Congestion, No Other      Cardiovascular:  No Chest Pain, No Pedal Edema, No Orthopnea, No Palpitations,     No Chest Pressure, No Dizziness, No Other      Gastrointestinal:  No Nausea, No Vomiting, No Dysphagia, No Constipation, No     Diarrhea, Appetite Good, No Appetite Fair, No Appetite Poor, No Early Satiety,     No Other      Genitourinary:  No Nocturia, No Dysuria, No Other      Musculoskeletal:  No Joint Effusions, No Joint Tenderness, No Joint Stiffness,     No Myalgias, No Aches, No Pains, No Other      Endocrine:  No Heat Intolerance, No Cold Intolerance, No Fatigue, No Blood     Sugar Control, No Other      Hematologic:  No Bleeding, No Bruising, No Swollen Glands, No Other      Allergic/Immunologic:  No Hives, No Throat closing off, No Nasal drip, No Itchy     eyes, No Hay fever, No Other      Psychological:  No Anxiety, No Depression, No Other      Neurological:  No Headaches, No Dizziness, No Weakness, No Numbness, No Other      Skin:  No Rash, No Open Wounds, No Edema, No Other            Vitals      Height 5 ft 0 in / 152.4 cm      Weight 106 lbs 4 oz / 48.349103 kg      BSA 1.43 m2      BMI 20.8 kg/m2      Temperature 98.4 F / 36.89 C - Temporal      Pulse 85      Blood Pressure 139/72 Sitting, Right Arm      Pulse Oximetry 98%, room air            Exam      Constitutional:  No acute distress, Conversant, Pleasant, No Weakness      Eyes:  Anicteric sclerae, Palpebral Conjunctivae (pink), MONIKA      HENT:  Oropharynx clear, No Erythema, No Tonsils, Buccal mucosae (pink)      Neck:  Supple, Full Range of Motion, No Masses      Cardiovascular:  RRR, No Murmurs, Normal PMI, No Peripheral Edema      Lungs:  Normal Respiratory Effort       Abdomen:  Soft, NABS, No Masses, No Tenderness      Chest:  Other (symmetrical and equal)      Breast:  Other (patient's left breast is now softer and less inflamed less     erythematous and ulcerations have disappeared.)      Lymphatic:  Axillae (left axilla shows no evidence of lymphadenopathy at this     time)      Extremities:  No digital cyanosis, Normal gait, Other (no deformity, no     limitation range of motion)      Psychological:  Appropriate affect, Intact judgement, Alert      Skin:  Normal temperature, Other (no dermatosis except for the left breast.)            Lab Results      CMP normal            Impression/Problem List      Inflammatory breast carcinoma, left side with pleural effusion status post     chemotherapy presently on Faslodex      and radiation therapy      Notes      New Medications      * Fulvestrant (Faslodex) 250 MG/5 ML SYRINGE: 500 MG IM ONCE            Plan      Continue radiation therapy      Continue Faslodex 500 mg IM every month.            Carbon Copy:      Copies To 2:   Jeromy Crowell            Patient Education:        Breast Cancer in Women            Hx Influenza Vaccination:  No      Influenza Vaccine Declined:  Yes      2 or More Falls Past Year?:  No      Fall Past Year with Injury?:  No      Hx Pneumococcal Vaccination:  No      Encouraged to follow-up with:  PCP regarding preventative exams.      Chart initiated by      Shellie Singleton MA                 Disclaimer: Converted document may not contain table formatting or lab diagrams. Please see [employer] [facility] for the authenticated document.

## 2021-05-28 NOTE — PROGRESS NOTES
Patient: TEETEE HARRISON     Acct: [id number]     Report: #[id number]  UNIT #: [id number]     : 1950    Encounter Date:2019  PRIMARY CARE:   ***Signed***  --------------------------------------------------------------------------------------------------------------------  DATE: 19      Primary Care Provider      Primary Care Provider:  PHIL PETTY            Referring Physician      Referring Provider not in look:        Martín Rose M.D.            Chief Complaint      FU (L) Breast Cancer            Subjective      69-year-old white female has a history of inflammatory left breast carcinoma th    at has metastasized to the pleura with the presence of pleural effusion.      Patient received neoadjuvant chemotherapy and radiation therapy the latter had     finished in 2017.  Patient started Faslodex on 2018.            Patient claims she does not want to take any pill with her Faslodex.            Past Med/Surg History            Past Med/Surg History:   No Hypertension             No Diabetes Mellitus             No Heart Disease             No Blood Clots             Cancer (Left breast cancer status post chemotherapy, radiation therapy,     hormonal therapy.)             No Lung Disease             No Kidney Disease             No Other            Social History      Social History:  No Tobacco Use, No Alcohol Use, No Recreational Drug use, No     Other            Allergies      Coded Allergies:             SULFA (SULFONAMIDE ANTIBIOTICS) (Verified  Allergy, Unknown, 18)           SULFAMETHOXAZOLE (Verified  Allergy, Unknown, 18)           TRIMETHOPRIM (Verified  Allergy, Unknown, 18)            Medications      Medications    Last Reconciled on 19 18:56 by RANDY CAMERON MD      No Active Prescriptions or Reported Meds      Current Medications      Current Medications Reviewed 19            Pain Assessment      Pain Intensity:  0       Description:  None            Review of Systems      General:  No Anxiety; Fatigue Scale: (0), Pain Scale: (0)      HEENT:  No Dysphagia      Respiratory:  No Cough      Cardiovascular:  No Chest Pain, No Pedal Edema      Gastrointestinal:  No Nausea; Appetite Good (Good)      Genitourinary:  No Nocturia      Musculoskeletal:  No Joint Effusions, No Joint Tenderness      Endocrine:  No Heat Intolerance      Hematologic:  No Bleeding      Allergic/Immunologic:  No Hives      Psychological:  No Anxiety      Neurological:  No Headaches, No Dizziness      Skin:  No Rash            Vitals      Weight 119 lbs 9.6 oz / 54.733159 kg      Temperature 97.9 F / 36.61 C      Pulse 71      Respirations 17      Blood Pressure 140/70      Pulse Oximetry 98%            Exam      Constitutional:  No acute distress, Conversant, Pleasant      Eyes:  Anicteric sclerae, Palpebral Conjunctivae ( pink), MONIKA      HENT:  Oropharynx clear; No Erythema; Buccal mucosae (Pink)      Neck:  Supple, Full Range of Motion      Cardiovascular:  RRR; No Murmurs; Normal PMI; No Peripheral Edema      Lungs:  Clear to Ausculation, Normal Respiratory Effort      Abdomen:  Soft, NABS; No Splenomegaly, No Tenderness      Chest:  Other (Symmetrical and equal)      Breast:  No Masses, No Tenderness      Lymphatic:  No Neck, No Axillae      Extremities:  No digital cyanosis, No digital ischemia, Normal gait, Other (No     deformity)      Neurological:  Cranial Nerve II-XII (Intact); No Focal Sensory deficits      Psychological:  Appropriate affect, Appropriate mood, Intact judgement, Alert      Skin:  Other (No dermatoses)            Lab Results      None available            Impression/Problem List      Inflammatory breast carcinoma, left side with pleural effusion status post     neoadjuvant chemotherapy and radiation therapy presently on Faslodex.            Plan      CBC, CMP, CA-27-29 in 1 month      Continue Faslodex 500 mg IM every month.  Patient  refuses Ibrance or similar     drugs.            Patient Education:        Breast Cancer in Women            PREVENTION      Hx Influenza Vaccination:  No      Influenza Vaccine Declined:  Yes      2 or More Falls Past Year?:  No      Fall Past Year with Injury?:  No      Encouraged to follow-up with:  PCP regarding preventative exams.      Chart initiated by      MORGAN Palm MA            Electronically signed by Kayla Guerrero  02/10/2020 17:24       Disclaimer: Converted document may not contain table formatting or lab diagrams. Please see [employer] [facility] for the authenticated document.

## 2021-05-28 NOTE — PROGRESS NOTES
Patient: TEETEE HARRISON     Acct: [id number]     Report: #[id number]  UNIT #: [id number]     : 1950    Encounter Date:2020  PRIMARY CARE:   ***Signed***  --------------------------------------------------------------------------------------------------------------------  DATE: 20      Primary Care Provider      Primary Care Provider:  PHIL PETTY            Referring Physician      Referring Provider not in look:        Martín Rose M.D.            Chief Complaint      FU (L) Breast Cancer            Subjective      10-year-old white female has a history of left breast carcinoma in 2017     at that time patient had small pleural effusion, involvement of the whole breast    with cancer and left axillary as well as mediastinal lymph nodes.  Patient was     ER positive NM positive and HER-2/darrin negative patient started neoadjuvant     chemotherapy followed by radiation therapy.  Patient had complete response and     is presently on Faslodex which was started on 2018.            Past Med/Surg History            Past Med/Surg History:   No Hypertension             No Diabetes Mellitus             No Heart Disease             No Blood Clots             Cancer (Left breast cancer status post chemotherapy, radiation therapy,     hormonal therapy.)             No Lung Disease             No Kidney Disease             No Other            Social History      Social History:  No Tobacco Use, No Alcohol Use, No Recreational Drug use, No     Other            Allergies      Coded Allergies:             SULFA (SULFONAMIDE ANTIBIOTICS) (Verified  Allergy, Unknown, 18)           SULFAMETHOXAZOLE (Verified  Allergy, Unknown, 18)           TRIMETHOPRIM (Verified  Allergy, Unknown, 18)            Medications      Medications    Last Reconciled on 20 19:14 by RANDY CAMERON MD      Calcium Carb/Vit D3 (CALCIUM 600-VIT D3 400 TABLET) 1 Each Tablet      1 EACH PO BID, #120  TAB 0 Refills         Reported         7/23/20      Current Medications      Current Medications Reviewed 7/23/20            Pain Assessment      Pain Intensity:  0      Description:  None            Review of Systems      General:  No Anxiety; Fatigue Scale: (0), Pain Scale: (0)      HEENT:  No Dysphagia, No Hearing Changes      Respiratory:  No Cough      Cardiovascular:  No Chest Pain      Gastrointestinal:  No Nausea, No Vomiting; Appetite Good (Good)      Genitourinary:  No Nocturia      Musculoskeletal:  No Joint Effusions, No Joint Tenderness      Endocrine:  No Heat Intolerance      Hematologic:  No Bleeding      Allergic/Immunologic:  No Hives, No Throat closing off      Psychological:  No Anxiety, No Depression      Neurological:  No Headaches      Skin:  No Rash            Vitals      Height 5 ft 5 in / 165.1 cm      Weight 110 lbs 3.2 oz / 49.419657 kg      BSA 1.54 m2      BMI 18.3 kg/m2      Temperature 97.3 F / 36.28 C      Pulse 68      Respirations 18      Blood Pressure 114/72      Pulse Oximetry 98%            Exam      Constitutional:  No acute distress, Conversant, Pleasant      Eyes:  Anicteric sclerae, Palpebral Conjunctivae, MONIKA      Neck:  Supple, Full Range of Motion      Cardiovascular:  RRR; No Murmurs; Normal PMI; No Peripheral Edema      Lungs:  Clear to Ausculation, Normal Respiratory Effort      Abdomen:  Soft, NABS; No Masses, No Tenderness      Chest:  Other (Symmetrical and equal)      Breast:  No Masses, No Tenderness, No Drainage; Other (No nodule)      Lymphatic:  No Neck, No Axillae      Extremities:  No digital cyanosis, No digital ischemia, Normal gait, Other (No     deformity)      Neurological:  Cranial Nerve II-XII (Intact); No Focal Sensory deficits      Psychological:  Appropriate affect, Appropriate mood, Intact judgement, Alert      Skin:  Other (No dermatoses)            Lab Results      Calcium 10.2, BUN 7 creatinine 0.57, liver function test normal             Impression/Problem List      Inflammatory breast carcinoma, left side with pleural effusion status post     neoadjuvant chemotherapy and radiation therapy presently on Faslodex.       Osteoporosis      Notes      New Medications      * Calcium Carb/Vit D3 (CALCIUM 600-VIT D3 400 TABLET) 1 EACH TABLET: 1 EACH PO       BID #120            Plan            Continue Faslodex 500 mg IM every month.  Patient refuses Ibrance or similar     drugs.      Calcium with vitamin D2 times a day      Prolia 60 mcg subcu every 6 months            Patient Education:        Breast Cancer in Women            PREVENTION      Hx Influenza Vaccination:  No      Influenza Vaccine Declined:  Yes      2 or More Falls in Past Year?:  No      Fall Past Year with Injury?:  No      Encouraged to follow-up with:  PCP regarding preventative exams.      Chart initiated by      MORGAN Chawla MA            Electronically signed by Kayla Guerrero  07/29/2020 19:20       Disclaimer: Converted document may not contain table formatting or lab diagrams. Please see [employer] [facility] for the authenticated document.

## 2021-05-28 NOTE — PROGRESS NOTES
Patient: TEETEE HARRISON     Acct: [id number]     Report: #[id number]  UNIT #: [id number]     : 1950    Encounter Date:2020  PRIMARY CARE:   ***Signed***  --------------------------------------------------------------------------------------------------------------------  DATE: 20      Primary Care Provider      Primary Care Provider:  PHIL PETTY            Referring Physician      Referring Provider not in look:        Martín Rose M.D.            Chief Complaint      FU (L) Breast Cancer            Subjective      69-year-old white female has a history of breast carcinoma stage IV metastasis t    o the pleura presenting as lower left pleural effusion.  Initial diagnosis was     done in 2017.  Patient had neoadjuvant chemotherapy with Adriamycin and Cytoxan     as well as radiation therapy.  Patient is presently on Faslodex.              CAT scan done last 2018 showed interval resolution of lingular nodule     and possible postradiation changes to the left breast with linear scarring in     the apex of the left lung            Past Med/Surg History            Past Med/Surg History:   No Hypertension             No Diabetes Mellitus             No Heart Disease             No Blood Clots             Cancer (Left breast cancer status post chemotherapy, radiation therapy,     hormonal therapy.)             No Lung Disease             No Kidney Disease             No Other            Social History      Social History:  No Tobacco Use, No Alcohol Use, No Recreational Drug use, No     Other            Allergies      Coded Allergies:             SULFA (SULFONAMIDE ANTIBIOTICS) (Verified  Allergy, Unknown, 18)           SULFAMETHOXAZOLE (Verified  Allergy, Unknown, 18)           TRIMETHOPRIM (Verified  Allergy, Unknown, 18)            Medications      Medications    Last Reconciled on 19 18:56 by RANDY CAMERON MD      No Active Prescriptions or Reported Meds       Current Medications      Current Medications Reviewed 1/9/20            Pain Assessment      Pain Intensity:  0      Description:  None            Review of Systems      General:  No Anxiety; Fatigue Scale: (0), Pain Scale: (0)      HEENT:  No Dysphagia      Respiratory:  No Cough, No Shortness of Air      Cardiovascular:  No Chest Pain      Gastrointestinal:  No Nausea; Appetite Good (Good)      Genitourinary:  No Nocturia      Musculoskeletal:  No Joint Effusions, No Joint Tenderness      Endocrine:  No Heat Intolerance      Hematologic:  No Bleeding      Allergic/Immunologic:  No Hives      Psychological:  No Anxiety      Neurological:  No Headaches, No Dizziness      Skin:  No Rash            Vitals      Height 5 ft 0.5 in / 153.67 cm      Weight 121 lbs 12.8 oz / 55.323546 kg      BSA 1.52 m2      BMI 23.4 kg/m2      Temperature 97.0 F / 36.11 C      Pulse 72      Respirations 18      Blood Pressure 138/81      Pulse Oximetry 98%            Exam      Constitutional:  No acute distress, Conversant, Pleasant      Eyes:  Anicteric sclerae, Palpebral Conjunctivae (Pink), MONIKA      HENT:  Oropharynx clear; No Erythema; Buccal mucosae (Pink)      Neck:  Supple, Full Range of Motion      Cardiovascular:  RRR; No Murmurs; Normal PMI; No Peripheral Edema      Lungs:  Clear to Ausculation, Normal Respiratory Effort      Abdomen:  Soft, NABS; No Tenderness      Chest:  Other (Symmetrical and equal)      Breast:  No Masses      Lymphatic:  No Neck, No Axillae      Extremities:  No digital cyanosis, No digital ischemia, Normal gait, Other (No     deformity)      Neurological:  Cranial Nerve II-XII (Intact); No Focal Sensory deficits      Psychological:  Appropriate affect, Appropriate mood, Intact judgement, Alert      Skin:  Normal temperature, Normal tone, Other (No dermatome)            Lab Results      CBC normal, CMP normal      CA-27-29 8.8            Impression/Problem List      Inflammatory breast carcinoma, left  side with pleural effusion status post     neoadjuvant chemotherapy and radiation therapy presently on Faslodex.            Plan      CBC, CMP in 1 month      Continue Faslodex 500 mg IM every month.            Patient Education:        Breast Cancer in Women            PREVENTION      Influenza Vaccine Declined:  Yes      2 or More Falls Past Year?:  No      Fall Past Year with Injury?:  No      Encouraged to follow-up with:  PCP regarding preventative exams.      Chart initiated by      MORGAN Palm MA            Electronically signed by Kayla Guerrero  02/10/2020 16:55       Disclaimer: Converted document may not contain table formatting or lab diagrams. Please see [employer] [facility] for the authenticated document.

## 2021-05-28 NOTE — PROGRESS NOTES
Patient: TEETEE HARRISON     Acct: [id number]     Report: #[id number]  UNIT #: [id number]     : 1950    Encounter Date:2018  PRIMARY CARE:   ***Signed***  --------------------------------------------------------------------------------------------------------------------  DATE: 18      Primary Care Provider      Primary Care Provider:  IVAN PARADA            Referring Physician      Referring Provider not in look:        Martín Rose M.D.            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  Cancer was ER     positive ME positive HER-2 negative.  Patient received 2 courses of Adriamycin     and Cytoxan.  Patient had neutropenic sepsis and transfusion dependent anemia     secondary to chemotherapy.  Chemotherapy was followed by radiation therapy     which finished on 2017.  Patient also on Faslodex.            Patient claims that she now only takes one can of ensure because her diet has     been doing pretty good.  She has had no pain and no fatigue.  she does her     usual activities of daily living.            Past Med/Surg History            Past Med/Surg History:   No Hypertension             No Diabetes Mellitus             No Heart Disease             No Blood Clots             Cancer             No Lung Disease             No Kidney Disease             No Other            Social History      Social History:  No Tobacco Use, No Alcohol Use, No Recreational Drug use, No     Other            Allergies      Coded Allergies:             SULFA (SULFONAMIDE ANTIBIOTICS) (Verified  Allergy, Unknown, 18)            Medications      Medications    Last Reconciled on 18 12:56 by MARY SHEETS      Fulvestrant (Faslodex) 250 Mg/5 Ml Syringe      500 MG IM ONCE, SYRINGE         Reported         3/22/18       Current Medications      Current Medications Reviewed 5/17/18            Pain Assessment      Pain Intensity:  0      Description:  None            Review of Systems      General:  No Anxiety, No Fatigue Scale:, No Pain Scale:, No Fever, No Other      HEENT:  No Dysphagia, No Hearing Changes, No Rhinorrhea, No Tinnitus, No Visual     Changes, No Nasal Congestion, No Epistaxis, No Other      Respiratory:  No Cough, No Shortness of Air, No Sputum Changes, No Wheezing, No     Hemoptysis, No Congestion, No Other      Cardiovascular:  No Chest Pain, No Pedal Edema, No Orthopnea, No Palpitations,     No Chest Pressure, No Dizziness, No Other      Gastrointestinal:  No Nausea, No Vomiting, No Dysphagia, No Constipation, No     Diarrhea, Appetite Good, No Appetite Fair, No Appetite Poor, No Early Satiety,     No Other      Genitourinary:  No Nocturia, No Dysuria, No Other      Musculoskeletal:  No Joint Effusions, No Joint Tenderness, No Joint Stiffness,     No Myalgias, No Aches, No Pains, No Other      Endocrine:  No Heat Intolerance, No Cold Intolerance, No Fatigue, No Blood     Sugar Control, No Other      Hematologic:  No Bleeding, No Bruising, No Swollen Glands, No Other      Allergic/Immunologic:  No Hives, No Throat closing off, No Nasal drip, No Itchy     eyes, No Hay fever, No Other      Psychological:  No Anxiety, No Depression, No Other      Neurological:  No Headaches, No Dizziness, No Weakness, No Numbness, No Other      Skin:  No Rash, No Open Wounds, No Edema, No Other            Vitals      Height 5 ft 0 in / 152.4 cm      Weight 106 lbs 9 oz / 48.21260 kg      BSA 1.43 m2      BMI 20.8 kg/m2      Temperature 98.2 F / 36.78 C - Temporal      Pulse 70      Blood Pressure 110/60 Sitting, Left Arm      Pulse Oximetry 94%, room air            Exam      Constitutional:  No acute distress, Conversant, Pleasant, No Weakness      Eyes:  Anicteric sclerae, Palpebral Conjunctivae (pink), MONIKA      HENT:   Oropharynx clear, No Erythema, Buccal mucosae (pink)      Neck:  Supple, Full Range of Motion      Cardiovascular:  RRR, No Murmurs, Normal PMI, No Peripheral Edema      Lungs:  Clear to Ausculation, Normal Respiratory Effort      Abdomen:  NABS, No Masses, No Tenderness      Chest:  Other (symmetrical and equal)      Breast:  Other (scab on the inner upper left breast.  left breast is less     discolored normal color appearing and less indurated.)      Lymphatic:  No Neck, Axillae (left)      Extremities:  No digital cyanosis, Normal gait, Other (no deformity no     medication range of motion)      Neurological:  Cranial Nerve II-XII, No Focal Sensory deficits      Psychological:  Appropriate affect, Appropriate mood, Intact judgement, Alert      Skin:  Other (no dermatosis)            Lab Results      Hemoglobin 13.2, hematocrit 39.7, white count 3.3, platelet count 275,000,     glucose 69            Impression/Problem List      Inflammatory breast carcinoma, left side with pleural effusion status post     chemotherapy and radiation therapy presently on Faslodex            Plan            Continue Faslodex 500 mg IM every month.            Patient Education:        Breast Cancer in Women            PREVENTION      Hx Influenza Vaccination:  No      Influenza Vaccine Declined:  Yes      2 or More Falls Past Year?:  No      Fall Past Year with Injury?:  No      Hx Pneumococcal Vaccination:  No      Encouraged to follow-up with:  PCP regarding preventative exams.      Chart initiated by      Shellie Singleton MA                 Disclaimer: Converted document may not contain table formatting or lab diagrams. Please see [employer] [facility] for the authenticated document.

## 2021-05-28 NOTE — PROGRESS NOTES
Patient: TEETEE HARRISON     Acct: [id number]     Report: #[id number]  UNIT #: [id number]     : 1950    Encounter Date:2019  PRIMARY CARE:   ***Signed***  --------------------------------------------------------------------------------------------------------------------  DATE: 19      Primary Care Provider      Primary Care Provider:  PHIL PETTY            Referring Physician      Referring Provider not in look:        Martín Rose M.D.            Chief Complaint      FU (L) Breast Cancer            Subjective      69-year-old white female with a history of left breast carcinoma presented with     bilateral axillary lymphadenopathy, mediastinal lymphadenopathy, left pleural     effusion and peau d'orange with ulceration of the left breast.  Patient received    3 courses of Adriamycin and Cytoxan because she had neutropenic sepsis at the     end.  This is followed up by radiation therapy and hormonal therapy with     Faslodex which was started in May 22, 2018.  Patient states that his CAT scan     showed no evidence of disease in the chest.            Past Med/Surg History            Past Med/Surg History:   No Hypertension             No Diabetes Mellitus             No Heart Disease             No Blood Clots             Cancer (Left breast cancer status post chemotherapy, radiation therapy,     hormonal therapy.)             No Lung Disease             No Kidney Disease             No Other            Social History      Social History:  No Tobacco Use, No Alcohol Use, No Recreational Drug use, No     Other            Allergies      Coded Allergies:             SULFA (SULFONAMIDE ANTIBIOTICS) (Verified  Allergy, Unknown, 18)           SULFAMETHOXAZOLE (Verified  Allergy, Unknown, 18)           TRIMETHOPRIM (Verified  Allergy, Unknown, 18)            Medications      Medications    Last Reconciled on 19 18:56 by RANDY CAMERON MD      No Active Prescriptions or  Reported Meds      Current Medications      Current Medications Reviewed 5/16/19            Pain Assessment      Pain Intensity:  0      Description:  None            Review of Systems      General:  No Anxiety; Fatigue Scale: (0), Pain Scale: (0)      HEENT:  No Dysphagia      Respiratory:  No Cough      Cardiovascular:  No Chest Pain      Gastrointestinal:  No Nausea, No Vomiting; Appetite Good (good)      Genitourinary:  No Nocturia      Musculoskeletal:  No Joint Effusions, No Joint Tenderness      Endocrine:  No Heat Intolerance      Hematologic:  No Bleeding      Allergic/Immunologic:  No Hives      Psychological:  No Anxiety, No Depression      Neurological:  No Headaches      Skin:  No Rash            Vitals      Height 5 ft 0 in / 152.4 cm      Weight 125 lbs 0.7 oz / 56.811650 kg      BSA 1.53 m2      BMI 24.4 kg/m2      Pulse 77      Respirations 18      Blood Pressure 167/79      Pulse Oximetry 95%            Exam      Constitutional:  No acute distress, Conversant, Pleasant      Eyes:  Anicteric sclerae, Palpebral Conjunctivae (Pink), MONIKA      HENT:  Oropharynx clear; No Erythema; Buccal mucosae (Pink)      Neck:  Supple, Full Range of Motion; No Masses      Cardiovascular:  RRR; No Murmurs; Normal PMI      Lungs:  Clear to Ausculation, Normal Respiratory Effort      Abdomen:  Soft, NABS; No Masses      Chest:  Other (Symmetrical and equal)      Breast:  No Masses, No Tenderness, No Drainage      Lymphatic:  No Neck, No Axillae      Extremities:  No digital cyanosis, No digital ischemia, Normal gait, Other (No     deformity)      Neurological:  Cranial Nerve II-XII (Intact); No Focal Sensory deficits      Psychological:  Appropriate affect, Appropriate mood, Intact judgement, Alert      Skin:  Normal temperature, Other (No dermatosis)            Lab Results      CMP is normal            Impression/Problem List      Inflammatory breast carcinoma, left side with pleural effusion status post      neoadjuvant chemotherapy and radiation therapy presently on Faslodex.            Plan      CBC, CMP in 1 month      Continue Faslodex 500 mg IM every month.            Patient Education:        Breast Cancer in Women            PREVENTION      Influenza Vaccine Declined:  Yes      2 or More Falls Past Year?:  No      Fall Past Year with Injury?:  No      Encouraged to follow-up with:  PCP regarding preventative exams.      Chart initiated by      MORGAN Palm MA                 Disclaimer: Converted document may not contain table formatting or lab diagrams. Please see [employer] [facility] for the authenticated document.

## 2021-05-28 NOTE — PROGRESS NOTES
Patient: TEETEE HARRISON     Acct: [id number]     Report: #[id number]  UNIT #: [id number]     : 1950    Encounter Date:2019  PRIMARY CARE:   ***Signed***  --------------------------------------------------------------------------------------------------------------------  DATE: 19      Primary Care Provider      Primary Care Provider:  IVAN PARADA            Referring Physician      Referring Provider not in look:        Martín Rose M.D.            Chief Complaint      FU (L) Breast Cancer            Subjective      69-year-old white female has initially presented with breast cancer last Decem    2017 with bilateral axillary lymphadenopathy, mediastinal lymphadenopathy     and pleural effusion left side.  Patient was given age adjuvant chemotherapy     with Adriamycin and Cytoxan.  Unfortunately patient has had to be hospitalized     for neutropenic sepsis and patient only got 3 courses.  Patient underwent     radiation therapy which finished on 2018.  Patient has been started on    Faslodex for May 22, 2018.            Patient had a CAT scan of the chest last week and is here for results.  Patient     plans to go on vacation this may and would be putting her treatment on hold.            Past Med/Surg History            Past Med/Surg History:   No Hypertension             No Diabetes Mellitus             No Heart Disease             No Blood Clots             Cancer             No Lung Disease             No Kidney Disease             No Other            Social History      Social History:  No Tobacco Use, No Alcohol Use, No Recreational Drug use, No     Other            Allergies      Coded Allergies:             SULFA (SULFONAMIDE ANTIBIOTICS) (Verified  Allergy, Unknown, 18)           SULFAMETHOXAZOLE (Verified  Allergy, Unknown, 18)           TRIMETHOPRIM (Verified  Allergy, Unknown, 18)            Medications      Medications    Last Reconciled  on 4/26/19 15:14 by RANDY CAMERON MD      No Active Prescriptions or Reported Meds            Current Medications      Current Medications Reviewed 4/25/19            Pain Assessment      Pain Intensity:  0      Description:  None            Review of Systems      General:  No Anxiety; Fatigue Scale: (0), Pain Scale: (0)      HEENT:  No Dysphagia      Respiratory:  No Cough, No Shortness of Air      Cardiovascular:  No Chest Pain      Gastrointestinal:  No Nausea; Appetite Good (good)      Genitourinary:  No Nocturia, No Dysuria      Musculoskeletal:  No Joint Effusions      Endocrine:  No Heat Intolerance      Hematologic:  No Bleeding, No Bruising      Allergic/Immunologic:  No Hives      Psychological:  No Anxiety, No Depression      Neurological:  No Headaches      Skin:  No Rash            Vitals      Height 5 ft 0 in / 152.4 cm      Weight 125 lbs 8 oz / 56.996957 kg      BSA 1.53 m2      BMI 24.5 kg/m2      Pulse 81      Respirations 16      Blood Pressure 145/80      Pulse Oximetry 96%            Exam      Constitutional:  No acute distress, Conversant, Pleasant      Eyes:  Anicteric sclerae, Palpebral Conjunctivae (Pink), MONIKA, Other (Glass)      HENT:  Oropharynx clear; No Erythema; Buccal mucosae (Pink)      Neck:  Supple, Full Range of Motion      Cardiovascular:  RRR; No Murmurs; Normal PMI; No Peripheral Edema      Lungs:  Clear to Ausculation, Normal Respiratory Effort      Abdomen:  Soft, NABS; No Masses, No Tenderness      Chest:  Other (Symmetrical and equal)      Breast:  No Masses, No Tenderness, No Drainage; Other (Skin changes have     disappeared)      Lymphatic:  No Neck, No Axillae      Extremities:  No digital cyanosis, No digital ischemia, Normal gait, Other (No     deformity)      Neurological:  Cranial Nerve II-XII (Intact); No Focal Sensory deficits      Psychological:  Appropriate affect, Appropriate mood, Intact judgement, Alert      Skin:  Normal temperature, Other (No dermatosis,  breast changes have resolved.)            Lab Results      On April 11 CA-27-29 was 15.1, white count 3.6, hemoglobin 12.8, hematocrit     39.4, platelet count 245,000      Radiology Impressions      CT scan of her chest showed interval resolution of lingular nodule consistent     with resolved focal pneumonia or mass.  No active disease      Suspect postradiation change to the left breast with linear scarring to apex     left lung.      Interval resolution of the small left pleural effusion.            Impression/Problem List      Inflammatory breast carcinoma, left side with pleural effusion status post     neoadjuvant chemotherapy and radiation therapy presently on Faslodex.  CAT scan     of the chest done on April 17, 2019.            Plan      CBC, CMP in 1 month      Continue Faslodex 500 mg IM every month.            Patient Education:        Breast Cancer in Women            PREVENTION      Influenza Vaccine Declined:  Yes      2 or More Falls Past Year?:  No      Fall Past Year with Injury?:  No      Encouraged to follow-up with:  PCP regarding preventative exams.      Chart initiated by      MORGAN Palm MA                 Disclaimer: Converted document may not contain table formatting or lab diagrams. Please see [employer] [facility] for the authenticated document.

## 2021-05-28 NOTE — PROGRESS NOTES
Patient: TEETEE HARRISON     Acct: [id number]     Report: #[id number]  UNIT #: [id number]     : 1950    Encounter Date:10/25/2018  PRIMARY CARE:   ***Signed***  --------------------------------------------------------------------------------------------------------------------  DATE: 10/25/18      Primary Care Provider      Primary Care Provider:  IVAN PARADA            Referring Physician      Referring Provider not in look:        Martín Rose M.D.            Chief Complaint      Follow up Left Breast Cancer            Subjective      68-year-old white female history of breast carcinoma left side with inflammatory    changes and pleural effusion on initial visit.  Patient had neoadjuvant     chemotherapy with Cytoxan and Adriamycin which she finished on 2018.      Patient also received radiation therapy.  Patient is presently on Faslodex     monthly.  Patient had a recent PET/CT scan done on 2018.  Patient     has no issues.            Past Med/Surg History            Past Med/Surg History:   No Hypertension             No Diabetes Mellitus             No Heart Disease             No Blood Clots             Cancer             No Lung Disease             No Kidney Disease             No Other            Social History      Social History:  No Tobacco Use, No Alcohol Use, No Recreational Drug use, No     Other            Allergies      Coded Allergies:             SULFA (SULFONAMIDE ANTIBIOTICS) (Verified  Allergy, Unknown, 18)            Medications      Medications    Last Reconciled on 18 12:56 by MARY SHEETS      Fulvestrant (Faslodex) 250 Mg/5 Ml Syringe      250 MG IM ONCE, SYRINGE         Reported         18      Current Medications      Current Medications Reviewed 18            Pain Assessment      Pain Intensity:  0      Description:  None            Review of Systems      General:  Anxiety; No Fatigue Scale:, No Pain Scale:, No Fever, No  Other      HEENT:  No Dysphagia, No Hearing Changes, No Rhinorrhea, No Tinnitus, No Visual     Changes, No Nasal Congestion, No Epistaxis, No Other      Respiratory:  No Cough, No Shortness of Air, No Sputum Changes, No Wheezing, No     Hemoptysis, No Congestion, No Other      Gastrointestinal:  No Nausea, No Vomiting, No Dysphagia, No Constipation, No     Diarrhea, No Appetite Good, No Appetite Fair, No Appetite Poor, No Early Satiet    y, No Other      Genitourinary:  No Nocturia, No Dysuria, No Other      Musculoskeletal:  No Joint Effusions, No Joint Tenderness, No Joint Stiffness,     No Myalgias, No Aches, No Pains, No Other      Endocrine:  No Heat Intolerance, No Cold Intolerance, No Fatigue, No Blood Sugar    Control, No Other      Hematologic:  No Bleeding, No Bruising, No Swollen Glands, No Other      Allergic/Immunologic:  No Hives, No Throat closing off, No Nasal drip, No Itchy     eyes, No Hay fever, No Other      Psychological:  Anxiety; No Depression, No Other      Neurological:  No Headaches, No Dizziness, No Weakness, No Numbness, No Other      Skin:  No Rash, No Open Wounds, No Edema, No Other            Vitals      Height 60 in / 152.4 cm      Weight 117 lbs 3 oz / 53.185823 kg      BSA 1.49 m2      BMI 22.9 kg/m2      Temperature 97 F / 36.11 C      Pulse 58      Respirations 18      Blood Pressure 134/97      Pulse Oximetry 99%      Comment: Done by Tara            Exam      Constitutional:  No acute distress, Conversant, Pleasant      Eyes:  Anicteric sclerae, Palpebral Conjunctivae, MONIKA      HENT:  Oropharynx clear; No Erythema; Buccal mucosae      Neck:  Supple, Full Range of Motion      Cardiovascular:  RRR; No Murmurs; Normal PMI; No Peripheral Edema      Lungs:  Clear to Ausculation, Normal Respiratory Effort      Abdomen:  Soft, NABS; No Tenderness (Symmetrical)      Chest:  Other      Breast:  Other (No edematousLeft breast looking more like normal skin is not     indurated,  consistency softer.)      Lymphatic:  No Neck, No Axillae      Extremities:  No digital cyanosis, Normal gait      Neurological:  Cranial Nerve II-XII (Intact); No Focal Sensory deficits      Psychological:  Appropriate affect, Appropriate mood, Intact judgement, Alert      Skin:  Normal temperature, Other            Lab Results      CMP normal      Radiology Impressions      PET CT scan showed decreased size of the left breast with ill-defined trabecular    thickening, skin thickening, and decreased FDG activity, consistent with     treatment-related change.  Previously described hypermetabolic lymphadenopathy     has resolved, consistent with treatment change.  Patchy opacity and interstitial    thickening in the anterior left upper lobe and lingula, likely radiation     fibrosis.  No abnormal FDG activity seen in a convincing pattern of metastatic     disease.            Impression/Problem List      Inflammatory breast carcinoma, left side with pleural effusion status post     chemotherapy and radiation therapy presently on Faslodex  - good response with     almost normal skin on the left breast and this appearance of axillary lymph     nodes.  PET/CT scan on 9/28/2018 showed improvement as well.            Leukopenia probably from chemotherapy/ radiation therapy in the past            Plan      CBC, CMP, CA-27-29 in 1 month      Continue Faslodex 500 mg IM every month.            PREVENTION      Hx Influenza Vaccination:  No      Influenza Vaccine Declined:  Yes      Hx Pneumococcal Vaccination:  No      Encouraged to follow-up with:  PCP regarding preventative exams.                 Disclaimer: Converted document may not contain table formatting or lab diagrams. Please see [employer] [facility] for the authenticated document.

## 2021-05-28 NOTE — PROGRESS NOTES
Patient: TEETEE HARRISON     Acct: [id number]     Report: #[id number]  UNIT #: [id number]     : 1950    Encounter Date:2020  PRIMARY CARE:   ***Signed***  --------------------------------------------------------------------------------------------------------------------  DATE: 3/5/20      Primary Care Provider      Primary Care Provider:  PHIL PETTY            Referring Physician      Referring Provider not in look:        Martín Rose M.D.            Chief Complaint      FU (L) Breast Cancer            Subjective      69-year-old white female with history of left breast carcinoma that has metastas    ized to her left axillary, mediastinal lymph nodes, and pleura presenting as per    effusion.  Patient had received neoadjuvant chemotherapy as well as radiation     therapy and is now on Faslodex monthly.  Patient's pleural effusion has     resolved.            Past Med/Surg History            Past Med/Surg History:   No Hypertension             No Diabetes Mellitus             No Heart Disease             No Blood Clots             Cancer (Left breast cancer status post chemotherapy, radiation therapy,     hormonal therapy.)             No Lung Disease             No Kidney Disease             No Other            Social History      Social History:  No Tobacco Use, No Alcohol Use, No Recreational Drug use, No     Other            Allergies      Coded Allergies:             SULFA (SULFONAMIDE ANTIBIOTICS) (Verified  Allergy, Unknown, 18)           SULFAMETHOXAZOLE (Verified  Allergy, Unknown, 18)           TRIMETHOPRIM (Verified  Allergy, Unknown, 18)            Medications      Medications    Last Reconciled on 3/21/20 17:20 by RANDY CAMERON MD      No Active Prescriptions or Reported Meds      Current Medications      Current Medications Reviewed 3/5/20            Pain Assessment      Pain Intensity:  0      Description:  None            Review of Systems      General:  No  Anxiety; Fatigue Scale: (0), Pain Scale: (0)      HEENT:  No Dysphagia, No Hearing Changes      Respiratory:  No Cough      Cardiovascular:  No Chest Pain, No Pedal Edema      Gastrointestinal:  No Nausea; Appetite Good (Good)      Genitourinary:  No Nocturia      Musculoskeletal:  No Joint Effusions, No Joint Tenderness      Endocrine:  No Heat Intolerance      Hematologic:  No Bleeding      Allergic/Immunologic:  No Hives      Psychological:  No Anxiety      Neurological:  No Headaches      Skin:  No Rash, No Open Wounds            Vitals      Height 5 ft 0.5 in / 153.67 cm      Weight 117 lbs 6.4 oz / 53.606458 kg      BSA 1.50 m2      BMI 22.6 kg/m2      Temperature 97.7 F / 36.5 C      Pulse 68      Respirations 16      Blood Pressure 110/60      Pulse Oximetry 96%            Exam      Constitutional:  No acute distress, Conversant, Pleasant      Eyes:  Anicteric sclerae, Palpebral Conjunctivae, MONIKA (Pink)      HENT:  Oropharynx clear; No Erythema; Buccal mucosae (Pink)      Neck:  Supple; No Full Range of Motion      Cardiovascular:  RRR; No Murmurs, No Peripheral Edema      Lungs:  Clear to Ausculation, Normal Respiratory Effort      Abdomen:  Soft; No Masses, No Tenderness      Chest:  Other      Breast:  No Masses, No Tenderness, No Drainage      Lymphatic:  No Neck, No Axillae      Extremities:  No digital cyanosis, No digital ischemia, Normal gait, Other (No     deformity)      Neurological:  Cranial Nerve II-XII (Intact); No Focal Sensory deficits      Psychological:  Appropriate affect, Appropriate mood, Intact judgement, Alert      Skin:  Normal temperature, Other (No dermatosis)            Lab Results      CMP normal            Impression/Problem List      Inflammatory breast carcinoma, left side with pleural effusion status post     neoadjuvant chemotherapy and radiation therapy presently on Faslodex.            Plan      CBC, CMP, CA-27-29 in 1 month      Continue Faslodex 500 mg IM every month.   Patient refuses Ibrance or similar     drugs.      Bone density      Screening bilateral mammogram            Patient Education:        Breast Cancer in Women            PREVENTION      Hx Influenza Vaccination:  No      Influenza Vaccine Declined:  Yes      2 or More Falls Past Year?:  No      Fall Past Year with Injury?:  No      Encouraged to follow-up with:  PCP regarding preventative exams.      Chart initiated by      MORGAN Chawla MA            Electronically signed by Kayla Guerrero  03/21/2020 17:20       Disclaimer: Converted document may not contain table formatting or lab diagrams. Please see [employer] [facility] for the authenticated document.

## 2021-05-28 NOTE — PROGRESS NOTES
Patient: TEETEE HARRISON     Acct: [id number]     Report: #[id number]  UNIT #: [id number]     : 1950    Encounter Date:2018  PRIMARY CARE:   ***Signed***  --------------------------------------------------------------------------------------------------------------------  DATE: 18      Primary Care Provider      Primary Care Provider:  IVAN PARADA            Referring Physician      Referring Provider not in look:        Martín Rose M.D.            Chief Complaint      Follow up Left Breast Cancer            Subjective      68-year-old white female has a history of inflammatory breast carcinoma     presenting as pleural effusion patient status post chemotherapy with Adriamycin     and Cytoxan for 3 courses.  Patient's fourth course of chemotherapy was given     since the patient developed neutropenic fever with SIRS.      Patient also had chest radiation dependent anemia.            Patient undergoing radiation therapy to her left chest and had been started on     Faslodex.      Patients undergoing physical therapy and she was told that she is doing well by     her therapist.  Her left breast also had improved with normal color and less     induration            Past Med/Surg History            Past Med/Surg History:   No Hypertension             No Diabetes Mellitus             No Heart Disease             No Blood Clots             Cancer             No Lung Disease             No Kidney Disease             No Other            Social History      Social History:  No Tobacco Use, No Alcohol Use, No Recreational Drug use, No     Other            Allergies      Coded Allergies:             SULFA (SULFONAMIDE ANTIBIOTICS) (Verified  Allergy, Unknown, 18)            Medications      Medications    Last Reconciled on 18 12:56 by MARY SHEETS      No Medication Information Entered            Current Medications      Current Medications Reviewed 18            Pain  Assessment      Pain Intensity:  0      Description:  None            Review of Systems      General:  No Anxiety, No Fatigue Scale:, No Pain Scale:, No Fever, No Other      HEENT:  No Dysphagia, No Hearing Changes, No Rhinorrhea, No Tinnitus, No Visual     Changes, No Nasal Congestion, No Epistaxis, No Other      Respiratory:  No Cough, No Shortness of Air, No Sputum Changes, No Wheezing, No     Hemoptysis, No Congestion, No Other      Cardiovascular:  No Chest Pain, No Pedal Edema, No Orthopnea, No Palpitations,     No Chest Pressure, No Dizziness, No Other      Gastrointestinal:  No Nausea, No Vomiting, No Dysphagia, No Constipation, No     Diarrhea, No Appetite Good, No Appetite Fair, No Appetite Poor, No Early Satiety    , No Other      Genitourinary:  No Nocturia, No Dysuria, No Other      Musculoskeletal:  No Joint Effusions, No Joint Tenderness, No Joint Stiffness,     No Myalgias, No Aches, No Pains, No Other      Endocrine:  No Heat Intolerance, No Cold Intolerance, No Fatigue, No Blood     Sugar Control, No Other      Hematologic:  No Bleeding, No Bruising, No Swollen Glands, No Other      Allergic/Immunologic:  No Hives, No Throat closing off, No Nasal drip, No Itchy     eyes, No Hay fever, No Other      Psychological:  No Anxiety, No Depression, No Other      Neurological:  No Headaches, No Dizziness, No Weakness, No Numbness, No Other      Skin:  No Rash, No Open Wounds, No Edema, No Other            Vitals      Height 5 ft 0 in / 152.4 cm      Weight 113 lbs 1 oz / 51.580999 kg      BSA 1.48 m2      BMI 22.1 kg/m2      Temperature 97.2 F / 36.22 C - Temporal      Pulse 87      Respirations 18      Blood Pressure 114/83 Sitting, Left Arm      Pulse Oximetry 94%, room air            Exam      Constitutional:  No acute distress, Conversant, Pleasant, No Weakness      Eyes:  Anicteric sclerae, Palpebral Conjunctivae (pink), MONIKA      HENT:  Oropharynx clear, No Erythema, Buccal mucosae (pink)      Neck:   Supple, Full Range of Motion, No Masses      Cardiovascular:  RRR, No Murmurs, Normal PMI, No Peripheral Edema      Lungs:  Clear to Ausculation, Normal Respiratory Effort      Abdomen:  Soft, NABS, No Tenderness      Chest:  Other (symmetrical and equal)      Breast:  Other (left breast almost normal with minimal induration but good color    )      Lymphatic:  No Neck, No Axillae      Extremities:  No digital cyanosis, Normal gait, Other (no deformity, no     limitation range of motion)      Neurological:  Cranial Nerve II-XII, No Focal Sensory deficits      Psychological:  Appropriate affect, Intact judgement, Alert      Skin:  Normal temperature, Other (no dermatosis)            Lab Results      Not available            Impression/Problem List      Inflammatory breast carcinoma, left side with pleural effusion status post     chemotherapy and radiation therapy presently on Faslodex            Plan            CBC CMP CA-27-29 on next visit      Continue Faslodex 500 mg IM every month.            Patient Education:        Breast Cancer in Women            PREVENTION      Hx Influenza Vaccination:  No      Influenza Vaccine Declined:  Yes      Hx Pneumococcal Vaccination:  No      Encouraged to follow-up with:  PCP regarding preventative exams.      Chart initiated by      Shellie Singleton MA                 Disclaimer: Converted document may not contain table formatting or lab diagrams. Please see [employer] [facility] for the authenticated document.

## 2021-05-28 NOTE — PROGRESS NOTES
Patient: TEETEE HARRISON     Acct: [id number]     Report: #[id number]  UNIT #: [id number]     : 1950    Encounter Date:2018  PRIMARY CARE:   ***Signed***  --------------------------------------------------------------------------------------------------------------------  DATE: 18      Primary Care Provider      Primary Care Provider:  IVAN PARADA            Referring Physician      Referring Provider not in look:        Martín Rose M.D.            Chief Complaint      Follow up Left Breast Cancer            Subjective      68-year-old white female has a history of inflammatory cell carcinoma with     pleural effusion.  Patient initially received 3 courses of Cytoxan and     Adriamycin and 4 courses was held because  patient developed neutropenic fever.      Patient's chemotherapy finished on 2018.  She started on Faslodex 500     mg IM monthly after loading dose.  Patient also received radiation therapy to     her left breast.            She is undergoing physical therapy for her left lymphedema.            Patient had been traveling and enjoying herself.            Past Med/Surg History            Past Med/Surg History:   No Hypertension             No Diabetes Mellitus             No Heart Disease             No Blood Clots             Cancer             No Lung Disease             No Kidney Disease             No Other            Social History      Social History:  No Tobacco Use, No Alcohol Use, No Recreational Drug use, No     Other            Allergies      Coded Allergies:             SULFA (SULFONAMIDE ANTIBIOTICS) (Verified  Allergy, Unknown, 18)            Medications      Medications    Last Reconciled on 18 12:56 by MARY SHEETS      No Medication Information Entered            Current Medications      Current Medications Reviewed 18            Pain Assessment      Pain Intensity:  0      Description:  None            Review of Systems       General:  No Anxiety, No Fatigue Scale:, No Pain Scale:, No Fever, No Other      HEENT:  No Dysphagia, No Hearing Changes, No Rhinorrhea, No Tinnitus, No Visual     Changes, No Nasal Congestion, No Epistaxis, No Other      Respiratory:  No Cough, No Shortness of Air, No Sputum Changes, No Wheezing, No     Hemoptysis, No Congestion, No Other      Cardiovascular:  No Chest Pain, No Pedal Edema, No Orthopnea, No Palpitations,     No Chest Pressure, No Dizziness, No Other      Gastrointestinal:  No Nausea, No Vomiting, No Dysphagia, No Constipation, No     Diarrhea, Appetite Good, No Appetite Fair, No Appetite Poor, No Early Satiety,     No Other      Genitourinary:  No Nocturia, No Dysuria, No Other      Musculoskeletal:  No Joint Effusions, No Joint Tenderness, No Joint Stiffness,     No Myalgias, No Aches, No Pains, No Other      Endocrine:  No Heat Intolerance, No Cold Intolerance, No Fatigue, No Blood     Sugar Control, No Other      Hematologic:  No Bleeding, No Bruising, No Swollen Glands, No Other      Allergic/Immunologic:  No Hives, No Throat closing off, No Nasal drip, No Itchy     eyes, No Hay fever, No Other      Psychological:  No Anxiety, No Depression, No Other      Neurological:  No Headaches, No Dizziness, No Weakness, No Numbness, No Other      Skin:  No Rash, No Open Wounds, No Edema, No Other            Vitals      Height 5 ft 0.5 in / 153.67 cm      Weight 104 lbs 4 oz / 47.463166 kg      BSA 1.42 m2      BMI 20.0 kg/m2      Temperature 99.7 F / 37.61 C - Temporal      Pulse 68      Blood Pressure 120/70 Sitting, Left Arm      Pulse Oximetry 99%, room air            Exam      Constitutional:  No acute distress, Conversant, Pleasant, No Weakness      Eyes:  Anicteric sclerae, Palpebral Conjunctivae (pink), MONIKA      HENT:  Oropharynx clear, No Erythema, Buccal mucosae (pink)      Neck:  Supple, Full Range of Motion      Cardiovascular:  RRR, No Murmurs, Normal PMI, No Peripheral Edema      Lungs:   Clear to Ausculation, Normal Respiratory Effort      Abdomen:  Soft, NABS, No Tenderness      Chest:  Other (symmetrical and equal in extension)      Breast:  No Masses, No Tenderness, No Drainage, Other (indurated left breast     however the color is now normal)      Lymphatic:  No Neck, No Axillae      Extremities:  No digital cyanosis, No digital ischemia, Normal gait, Other (no     deformity no limitation of range of motion)      Neurological:  Cranial Nerve II-XII (Intact), No Focal Sensory deficits      Psychological:  Appropriate affect, Intact judgement, Alert      Skin:  Normal temperature, Other (no dermatosis)            Lab Results      Chemistry normal CBC showed low white count of 2.4            Impression/Problem List      Inflammatory breast carcinoma, left side with pleural effusion status post     chemotherapy and radiation therapy presently on Faslodex            Leukopenia probably from chemotherapy radiation therapy in the past            Plan      CBC, CMP, CA-27-29 in 1 month      Continue Faslodex 500 mg IM every month.            Patient Education:        Breast Cancer in Women            PREVENTION      Hx Influenza Vaccination:  No      Influenza Vaccine Declined:  Yes      Hx Pneumococcal Vaccination:  No      Encouraged to follow-up with:  PCP regarding preventative exams.      Chart initiated by      Shellie Singleton MA                 Disclaimer: Converted document may not contain table formatting or lab diagrams. Please see [employer] [facility] for the authenticated document.

## 2021-05-28 NOTE — PROGRESS NOTES
Patient: TEETEE HARRISON     Acct: [id number]     Report: #[id number]  UNIT #: [id number]     : 1950    Encounter Date:2020  PRIMARY CARE:   ***Signed***  --------------------------------------------------------------------------------------------------------------------  DATE: 20      Primary Care Provider      Primary Care Provider:  PHIL PETTY            Referring Physician      Referring Provider not in look:        Martín Rose M.D.            Chief Complaint      FU (L) Breast Cancer            Subjective      70-year-old white female has a history of left inflammatory breast carcinoma wi    th pleural effusion.  Patient had received chemotherapy, radiation therapy and     presently on hormonal treatment.            Patient is doing well            Past Med/Surg History            Past Med/Surg History:   No Hypertension             No Diabetes Mellitus             No Heart Disease             No Blood Clots             Cancer (Left breast cancer status post chemotherapy, radiation therapy,     hormonal therapy.)             No Lung Disease             No Kidney Disease             No Other            Social History      Social History:  No Tobacco Use, No Alcohol Use, No Recreational Drug use, No     Other            Allergies      Coded Allergies:             SULFA (SULFONAMIDE ANTIBIOTICS) (Verified  Allergy, Unknown, 18)           SULFAMETHOXAZOLE (Verified  Allergy, Unknown, 18)           TRIMETHOPRIM (Verified  Allergy, Unknown, 18)            Medications      Medications    Last Reconciled on 20 19:14 by RANDY CAMERON MD      Calcium Carbonate/Vitamin D3 ([employer] 500-VIT D3 200 TABLET) 1 Each Tablet      2 EACH PO QDAY, TABLET         Reported         20      Current Medications      Current Medications Reviewed 20            Pain Assessment      Pain Intensity:  0      Description:  None            Review of Systems      General:  No Anxiety;  Fatigue Scale: (0), Pain Scale: (0)      HEENT:  No Dysphagia, No Hearing Changes      Respiratory:  No Cough      Cardiovascular:  No Chest Pain      Gastrointestinal:  No Nausea, No Vomiting; Appetite Good (Good)      Genitourinary:  No Nocturia      Musculoskeletal:  No Joint Effusions, No Joint Tenderness      Endocrine:  No Heat Intolerance      Hematologic:  No Bleeding      Allergic/Immunologic:  No Hives      Psychological:  No Anxiety      Neurological:  No Headaches      Skin:  No Rash, No Open Wounds            Vitals      Height 5 ft 0.5 in / 153.67 cm      Weight 114 lbs 9.6 oz / 51.263009 kg      BSA 1.48 m2      BMI 22.0 kg/m2      Temperature 96.4 F / 35.78 C      Pulse 67      Respirations 18      Blood Pressure 124/76      Pulse Oximetry 98%            Exam      Constitutional:  No acute distress, Conversant, Pleasant      Eyes:  Anicteric sclerae, Palpebral Conjunctivae (Pink), MONIKA      Neck:  Supple, Full Range of Motion      Cardiovascular:  RRR; No Murmurs; Normal PMI; No Peripheral Edema      Lungs:  Clear to Ausculation, Normal Respiratory Effort      Abdomen:  Soft, NABS; No Masses, No Tenderness      Chest:  Other (Symmetrical)      Breast:  No Masses, No Tenderness, No Drainage      Lymphatic:  No Neck      Extremities:  No digital cyanosis, No digital ischemia, Normal gait, Other (No     deformity)      Neurological:  Cranial Nerve II-XII (Intact); No Focal Sensory deficits      Psychological:  Appropriate affect, Appropriate mood, Intact judgement, Alert      Skin:  Other            Lab Results      CMP showed sugar of 146 the rest of the parameters were normal            Impression/Problem List      Inflammatory breast carcinoma, left side with pleural effusion status post     neoadjuvant chemotherapy and radiation therapy presently on Faslodex.       Osteoporosis            Plan            Continue Faslodex 500 mg IM every month.  Patient refuses Ibrance or similar     drugs.       Calcium with vitamin D2 times a day      Prolia 60 mcg subcu every 6 months            Patient Education:        Breast Cancer in Women            PREVENTION      Hx Influenza Vaccination:  No      Influenza Vaccine Declined:  Yes      2 or More Falls Past Year?:  No      Fall Past Year with Injury?:  No      Encouraged to follow-up with:  PCP regarding preventative exams.      Chart initiated by      MORGAN Chawla MA            Electronically signed by Kayla Guerrero  04/30/2020 19:14       Disclaimer: Converted document may not contain table formatting or lab diagrams. Please see [email or web address] [facility] for the authenticated document.

## 2021-05-28 NOTE — PROGRESS NOTES
Patient: TEETEE HARRISON     Acct: [id number]     Report: #[id number]  UNIT #: [id number]     : 1950    Encounter Date:2020  PRIMARY CARE:   ***Signed***  --------------------------------------------------------------------------------------------------------------------  DATE: 20      Primary Care Provider      Primary Care Provider:  PHIL PETTY            Referring Physician      Referring Provider not in look:        Martín Rose M.D.            Chief Complaint      FU (L) Breast Cancer            Subjective      70-year-old white female has been diagnosed with left breast carcinoma on     2017 presented with small pleural effusion, nonresectable breast cancer    because of the involvement of the whole breast, extension to the chest wall and     anterior mediastinum with partial destruction of the anterior left second rib     and the sternum, left axillary, intramammary and mediastinal adenopathy            Patient received neoadjuvant treatment with Adriamycin and Cytoxan and radiation    therapy which finished on 2018.  PET CT scan done on 2018 showed decreased size of the left breast with ill-defined trabecular     thickening, skin thickening and decreased FDG activity with a maximum SUV of 3.2    consistent with treatment related change previously enlarged hypermetabolic     bilateral axillary, mediastinal, intramammary lymphadenopathy is no longer     identified with no abnormal FDG activity seen consistent with treatment change.            Patient was started on Faslodex on 2018.  Patient had refused     additional treatment including Ibrance or Kisqali.            Repeat CAT scan done in 2019 showed interval resolution of lingular     nodule consistent with resolved focal pneumonia mass.  Suspect postradiation     changes left breast with linear scarring in the apex of the left lung.  No     active disease  bilateral screening mammogram done on January 24, 2019 showed     markedly decreased size of the left breast which may be post radiation therapy     with skin thickening and benign rodlike calcifications.  Bilateral mammogram     done in March 19, 2020 showed no mammographic signs of malignancy.            Past Med/Surg History            Past Med/Surg History:   No Hypertension             No Diabetes Mellitus             No Heart Disease             No Blood Clots             Cancer (Left breast cancer status post chemotherapy, radiation therapy,     hormonal therapy.)             No Lung Disease             No Kidney Disease             No Other            Social History      Social History:  No Tobacco Use, No Alcohol Use, No Recreational Drug use, No     Other            Allergies      Coded Allergies:             SULFA (SULFONAMIDE ANTIBIOTICS) (Verified  Allergy, Unknown, 6/14/18)           SULFAMETHOXAZOLE (Verified  Allergy, Unknown, 12/20/18)           TRIMETHOPRIM (Verified  Allergy, Unknown, 12/20/18)            Medications      Medications    Last Reconciled on 4/30/20 19:14 by RANDY CAMERON MD      Calcium Carb/Vit D3 (CALCIUM 600-VIT D3 400 TABLET) 1 Each Tablet      1 EACH PO BID, #120 TAB 0 Refills         Reported         7/23/20      Current Medications      Current Medications Reviewed 8/20/20            Pain Assessment      Pain Intensity:  0      Description:  None            Review of Systems      General:  No Anxiety; Fatigue Scale: (0), Pain Scale: (0)      HEENT:  No Dysphagia, No Hearing Changes      Respiratory:  No Cough      Cardiovascular:  No Chest Pain, No Pedal Edema      Gastrointestinal:  No Nausea; Appetite Good (Good)      Genitourinary:  No Nocturia      Musculoskeletal:  No Joint Effusions, No Joint Tenderness      Endocrine:  No Heat Intolerance      Hematologic:  No Bleeding, No Bruising      Allergic/Immunologic:  No Hives      Psychological:  No Anxiety      Neurological:  No  Headaches, No Dizziness      Skin:  No Rash            Vitals      Height 5 ft 0.5 in / 153.67 cm      Weight 110 lbs 9.6 oz / 50.592570 kg      BSA 1.46 m2      BMI 21.2 kg/m2      Temperature 96.7 F / 35.94 C      Pulse 65      Respirations 17      Blood Pressure 128/78      Pulse Oximetry 98%            Exam      Constitutional:  No acute distress, Conversant, Pleasant      Eyes:  Anicteric sclerae, Palpebral Conjunctivae (Pink), MONIKA      Neck:  Supple, Full Range of Motion      Cardiovascular:  RRR; No Murmurs; Normal PMI; No Peripheral Edema      Lungs:  Clear to Ausculation, Normal Respiratory Effort      Abdomen:  Soft; No Masses, No Tenderness      Chest:  Other (Symmetrical and equal)      Breast:  No Masses, No Tenderness, No Drainage      Lymphatic:  No Neck, No Axillae      Extremities:  No digital cyanosis, No digital ischemia, Normal gait, Other (No     deformity)      Neurological:  Cranial Nerve II-XII (Intact); No Focal Sensory deficits      Psychological:  Appropriate affect, Appropriate mood, Intact judgement, Alert      Skin:  Other (Dermatosis)            Lab Results      CMP showed glucose of 154 BUN 7/creatinine 0.61, calcium 9.2            Impression/Problem List      Inflammatory breast carcinoma, left side with pleural effusion status post     neoadjuvant chemotherapy and radiation therapy presently on Faslodex.       Osteoporosis            Plan            Continue Faslodex 500 mg IM every month.  Patient refuses Ibrance or similar     drugs.      Calcium with vitamin D2 times a day      Prolia 60 mcg subcu every 6 months            Return to clinic in 1 month with CBC, CMP, CA-27-29.            Patient Education:        Breast Cancer in Women            PREVENTION      Hx Influenza Vaccination:  No      Influenza Vaccine Declined:  Yes      2 or More Falls in Past Year?:  No      Fall Past Year with Injury?:  No      Encouraged to follow-up with:  PCP regarding preventative exams.       Chart initiated by      MORGAN Chawla MA            Electronically signed by Kayla Guerrero  08/25/2020 18:47       Disclaimer: Converted document may not contain table formatting or lab diagrams. Please see [employer] for the authenticated document.

## 2021-09-08 PROBLEM — C50.912 MALIGNANT NEOPLASM OF LEFT FEMALE BREAST: Status: ACTIVE | Noted: 2017-12-08

## 2021-09-10 PROBLEM — R41.3 MEMORY CHANGE: Status: ACTIVE | Noted: 2021-09-10

## 2021-09-13 ENCOUNTER — OFFICE VISIT (OUTPATIENT)
Dept: ONCOLOGY | Facility: HOSPITAL | Age: 71
End: 2021-09-13

## 2021-09-13 VITALS
WEIGHT: 116.4 LBS | OXYGEN SATURATION: 100 % | HEART RATE: 63 BPM | SYSTOLIC BLOOD PRESSURE: 136 MMHG | TEMPERATURE: 97.2 F | DIASTOLIC BLOOD PRESSURE: 60 MMHG | RESPIRATION RATE: 18 BRPM | BODY MASS INDEX: 23.51 KG/M2

## 2021-09-13 DIAGNOSIS — C50.912 MALIGNANT NEOPLASM OF LEFT FEMALE BREAST, UNSPECIFIED ESTROGEN RECEPTOR STATUS, UNSPECIFIED SITE OF BREAST (HCC): Primary | ICD-10-CM

## 2021-09-13 DIAGNOSIS — M81.0 AGE-RELATED OSTEOPOROSIS WITHOUT CURRENT PATHOLOGICAL FRACTURE: ICD-10-CM

## 2021-09-13 PROCEDURE — G0463 HOSPITAL OUTPT CLINIC VISIT: HCPCS | Performed by: INTERNAL MEDICINE

## 2021-09-13 PROCEDURE — 99215 OFFICE O/P EST HI 40 MIN: CPT | Performed by: INTERNAL MEDICINE

## 2021-09-13 RX ORDER — DONEPEZIL HYDROCHLORIDE 10 MG/1
10 TABLET, FILM COATED ORAL NIGHTLY
COMMUNITY
Start: 2021-08-25

## 2021-09-13 NOTE — PROGRESS NOTES
Patient  Sweetie Huitron    Location  [facility] HEMATOLOGY & ONCOLOGY    Chief Complaint  Breast Cancer and Follow-up    Referring Provider: No Known Provider  PCP: Do Garvin APRN    Subjective          Oncology/Hematology History Overview Note   ER+ Left breast cancer:    - diagnosed December 2017 presented with small pleural effusion and nonresectable breast cancer due to involvement of the whole breast, extension to the chest wall and anterior mediastinum with partial destruction of the anterior left second rib and the sternum, left axillary, intramammary and mediastinal adenopathy.            - neoadjuvant treatment with Adriamycin and Cytoxan and radiation    therapy which finished on April 17, 2018.      - PET CT scan done on September 28, 2018 showed decreased size of the left breast with ill-defined trabecular thickening, skin thickening and decreased FDG activity with a maximum SUV of 3.2 consistent with treatment related change previously enlarged hypermetabolic bilateral axillary, mediastinal, intramammary lymphadenopathy is no longer   identified with no abnormal FDG activity seen consistent with treatment change.            - Started on Faslodex February 22, 2018.  Patient had refused additional treatment including Ibrance or Kisqali.            - Repeat CAT scan done in April 17, 2019 showed interval resolution of lingular nodule consistent with resolved focal pneumonia or likely postradiation changes left breast with linear scarring in the apex of the left lung.      - No active disease bilateral screening mammogram done on January 24, 2019 showed markedly decreased size of the left breast which may be post radiation therapy with skin thickening and benign rodlike calcifications.  Bilateral mammogram done in March 19, 2020 showed no mammographic signs of malignancy.        Osteoporosis:  - DEXA scan on 3/19/20: osteoporsis  - currently on Prolia     Malignant neoplasm  of left female breast (CMS/HCC)   12/8/2017 Initial Diagnosis    Malignant neoplasm of left female breast (CMS/HCC)         History of Present Illness    Patient comes in today to to establish care.  She was previously seen by Dr. Guerrero and then transferred her care to a provider in [place].  Both of since retired.  She comes in with her sister who is her guardian at this time due to a diagnosis of Alzheimer's.    I reviewed the records of both prior oncologists as well as her neurologist and confirmed the diagnosis of Alzheimer's with delusions.  She is currently on medication and is responding well according to her sister.    It appears her last dose of Faslodex was given on 8/26/2021.  She is also on Prolia which was last given in June.  At this time it is unclear when she had any prior imaging.    Review of Systems   Constitutional: Negative for appetite change, diaphoresis, fatigue, fever, unexpected weight gain and unexpected weight loss.   HENT: Negative for hearing loss, mouth sores, sore throat, swollen glands, trouble swallowing and voice change.    Eyes: Negative for blurred vision.   Respiratory: Negative for cough, shortness of breath and wheezing.    Cardiovascular: Negative for chest pain and palpitations.   Gastrointestinal: Negative for abdominal pain, blood in stool, constipation, diarrhea, nausea and vomiting.   Endocrine: Negative for cold intolerance and heat intolerance.   Genitourinary: Negative for difficulty urinating, dysuria, frequency, hematuria and urinary incontinence.   Musculoskeletal: Negative for arthralgias, back pain and myalgias.   Skin: Negative for rash, skin lesions and bruise.   Neurological: Negative for dizziness, seizures, weakness, numbness and headache.   Hematological: Does not bruise/bleed easily.   Psychiatric/Behavioral: Negative for depressed mood. The patient is not nervous/anxious.        Past Medical History:   Diagnosis Date   • Breast cancer (CMS/[facility])    •  Cancer (CMS/HCC)    • Memory change    • Memory loss      Past Surgical History:   Procedure Laterality Date   •  SECTION       Social History     Socioeconomic History   • Marital status:      Spouse name: Not on file   • Number of children: Not on file   • Years of education: Not on file   • Highest education level: Not on file   Tobacco Use   • Smoking status: Never Smoker   Substance and Sexual Activity   • Alcohol use: Not Currently   • Drug use: Never   • Sexual activity: Defer     Family History   Problem Relation Age of Onset   • Arthritis Other    • Stroke Other    • Heart disease Other        Objective   Physical Exam  General: Alert, cooperative, no acute distress, well appearing and well dressed  Eyes: Anicteric sclera, PERRLA  Respiratory: normal respiratory effort  Cardiovascular: no lower extremity edema  Skin: Normal tone, no rash, no lesions  Psychiatric: Appropriate affect, able to communicate and answer basic questions.  Pleasant  Neurologic: No focal sensory or motor deficits, dementia was not overtly apparent given the patient's limited answers to questions  Musculoskeletal: Normal muscle strength and tone, able ambulate normally  Extremities: No clubbing, cyanosis, or deformities    Vitals:    21 1515   BP: 136/60  Comment: RT ARM   Pulse: 63   Resp: 18   Temp: 97.2 °F (36.2 °C)   TempSrc: Temporal   SpO2: 100%  Comment: RM AIR   Weight: 52.8 kg (116 lb 6.5 oz)   PainSc: 0-No pain               PHQ-9 Total Score: 0       Result Review :   The following data was reviewed by: Saba Juarez MD PhD on 2021:  No results found for: HGB, HCT, MCV, PLT, WBC, NEUTROABS, LYMPHSABS, MONOSABS, EOSABS, BASOSABS  No results found for: GLUCOSE, BUN, CREATININE, NA, K, CL, CO2, CALCIUM, PROTEINTOT, ALBUMIN, BILITOT, ALKPHOS, AST, ALT       Assessment and Plan    Diagnoses and all orders for this visit:    1. Malignant neoplasm of left female breast, unspecified estrogen receptor  status, unspecified site of breast (CMS/[facility]) (Primary)    2. Age-related osteoporosis without current pathological fracture      ER positive breast cancer: I will confirm that date of her most recent Faslodex treatment and schedule the next 4 weeks later, likely on September 23.  I will hold off on checking labs today but will get CBC, CMP, and phosphorus when she returns for Faslodex.  I will follow up with her when it is time for her second dose of Faslodex in this clinic.    Osteoporosis: We will confirm the day of her last dose of Prolia and schedule the next 6 months later.  She will be due for her next DEXA scan in March 2022.    I spent 45 minutes caring for Sweetie on this date of service. This time includes time spent by me in the following activities: preparing for the visit, reviewing tests, obtaining and/or reviewing a separately obtained history, performing a medically appropriate examination and/or evaluation, counseling and educating the patient/family/caregiver, ordering medications, tests, or procedures, documenting information in the medical record and independently interpreting results and communicating that information with the patient/family/caregiver    Patient was given instructions and counseling regarding her condition or for health maintenance advice. Please see specific information pulled into the AVS if appropriate.     Saba Juarez MD PhD    9/13/2021

## 2021-09-24 ENCOUNTER — HOSPITAL ENCOUNTER (OUTPATIENT)
Dept: ONCOLOGY | Facility: HOSPITAL | Age: 71
Setting detail: INFUSION SERIES
Discharge: HOME | End: 2021-09-24

## 2021-09-24 ENCOUNTER — OFFICE VISIT (OUTPATIENT)
Dept: ONCOLOGY | Facility: HOSPITAL | Age: 71
End: 2021-09-24

## 2021-09-24 ENCOUNTER — LAB (OUTPATIENT)
Dept: ONCOLOGY | Facility: HOSPITAL | Age: 71
End: 2021-09-24

## 2021-09-24 VITALS
DIASTOLIC BLOOD PRESSURE: 61 MMHG | RESPIRATION RATE: 16 BRPM | SYSTOLIC BLOOD PRESSURE: 129 MMHG | WEIGHT: 112.43 LBS | BODY MASS INDEX: 22.71 KG/M2 | OXYGEN SATURATION: 100 % | TEMPERATURE: 97.8 F | HEART RATE: 74 BPM

## 2021-09-24 DIAGNOSIS — C50.912 MALIGNANT NEOPLASM OF LEFT FEMALE BREAST, UNSPECIFIED ESTROGEN RECEPTOR STATUS, UNSPECIFIED SITE OF BREAST (HCC): ICD-10-CM

## 2021-09-24 DIAGNOSIS — E87.6 HYPOKALEMIA: ICD-10-CM

## 2021-09-24 DIAGNOSIS — C50.912 MALIGNANT NEOPLASM OF LEFT FEMALE BREAST, UNSPECIFIED ESTROGEN RECEPTOR STATUS, UNSPECIFIED SITE OF BREAST (HCC): Primary | ICD-10-CM

## 2021-09-24 DIAGNOSIS — Z45.2 ADJUSTMENT AND MANAGEMENT OF VASCULAR ACCESS DEVICE: ICD-10-CM

## 2021-09-24 LAB
ALBUMIN SERPL-MCNC: 4.36 G/DL (ref 3.5–5.2)
ALBUMIN/GLOB SERPL: 1.7 G/DL
ALP SERPL-CCNC: 53 U/L (ref 39–117)
ALT SERPL W P-5'-P-CCNC: 9 U/L (ref 1–33)
ANION GAP SERPL CALCULATED.3IONS-SCNC: 8.6 MMOL/L (ref 5–15)
AST SERPL-CCNC: 16 U/L (ref 1–32)
BASOPHILS # BLD AUTO: 0.02 10*3/MM3 (ref 0–0.2)
BASOPHILS NFR BLD AUTO: 0.5 % (ref 0–1.5)
BILIRUB SERPL-MCNC: 0.5 MG/DL (ref 0–1.2)
BUN SERPL-MCNC: 11 MG/DL (ref 8–23)
BUN/CREAT SERPL: 13.8 (ref 7–25)
CALCIUM SPEC-SCNC: 9.3 MG/DL (ref 8.6–10.5)
CHLORIDE SERPL-SCNC: 103 MMOL/L (ref 98–107)
CO2 SERPL-SCNC: 25.4 MMOL/L (ref 22–29)
CREAT SERPL-MCNC: 0.8 MG/DL (ref 0.57–1)
DEPRECATED RDW RBC AUTO: 44.1 FL (ref 37–54)
EOSINOPHIL # BLD AUTO: 0.18 10*3/MM3 (ref 0–0.4)
EOSINOPHIL NFR BLD AUTO: 4.2 % (ref 0.3–6.2)
ERYTHROCYTE [DISTWIDTH] IN BLOOD BY AUTOMATED COUNT: 13 % (ref 12.3–15.4)
GFR SERPL CREATININE-BSD FRML MDRD: 71 ML/MIN/1.73
GLOBULIN UR ELPH-MCNC: 2.6 GM/DL
GLUCOSE SERPL-MCNC: 145 MG/DL (ref 65–99)
HCT VFR BLD AUTO: 39.7 % (ref 34–46.6)
HGB BLD-MCNC: 13.1 G/DL (ref 12–15.9)
IMM GRANULOCYTES # BLD AUTO: 0 10*3/MM3 (ref 0–0.05)
IMM GRANULOCYTES NFR BLD AUTO: 0 % (ref 0–0.5)
LYMPHOCYTES # BLD AUTO: 1.88 10*3/MM3 (ref 0.7–3.1)
LYMPHOCYTES NFR BLD AUTO: 43.8 % (ref 19.6–45.3)
MCH RBC QN AUTO: 30.5 PG (ref 26.6–33)
MCHC RBC AUTO-ENTMCNC: 33 G/DL (ref 31.5–35.7)
MCV RBC AUTO: 92.5 FL (ref 79–97)
MONOCYTES # BLD AUTO: 0.27 10*3/MM3 (ref 0.1–0.9)
MONOCYTES NFR BLD AUTO: 6.3 % (ref 5–12)
NEUTROPHILS NFR BLD AUTO: 1.94 10*3/MM3 (ref 1.7–7)
NEUTROPHILS NFR BLD AUTO: 45.2 % (ref 42.7–76)
PLATELET # BLD AUTO: 240 10*3/MM3 (ref 140–450)
PMV BLD AUTO: 9.2 FL (ref 6–12)
POTASSIUM SERPL-SCNC: 3.3 MMOL/L (ref 3.5–5.2)
PROT SERPL-MCNC: 7 G/DL (ref 6–8.5)
RBC # BLD AUTO: 4.29 10*6/MM3 (ref 3.77–5.28)
SODIUM SERPL-SCNC: 137 MMOL/L (ref 136–145)
WBC # BLD AUTO: 4.29 10*3/MM3 (ref 3.4–10.8)

## 2021-09-24 PROCEDURE — G0463 HOSPITAL OUTPT CLINIC VISIT: HCPCS | Performed by: INTERNAL MEDICINE

## 2021-09-24 PROCEDURE — 85025 COMPLETE CBC W/AUTO DIFF WBC: CPT | Performed by: INTERNAL MEDICINE

## 2021-09-24 PROCEDURE — 80053 COMPREHEN METABOLIC PANEL: CPT | Performed by: INTERNAL MEDICINE

## 2021-09-24 PROCEDURE — 99213 OFFICE O/P EST LOW 20 MIN: CPT | Performed by: INTERNAL MEDICINE

## 2021-09-24 PROCEDURE — 96401 CHEMO ANTI-NEOPL SQ/IM: CPT

## 2021-09-24 PROCEDURE — 25010000002 HEPARIN LOCK FLUSH PER 10 UNITS: Performed by: INTERNAL MEDICINE

## 2021-09-24 PROCEDURE — 25010000002 FULVESTRANT PER 25 MG: Performed by: INTERNAL MEDICINE

## 2021-09-24 PROCEDURE — 36591 DRAW BLOOD OFF VENOUS DEVICE: CPT

## 2021-09-24 RX ORDER — SODIUM CHLORIDE 0.9 % (FLUSH) 0.9 %
20 SYRINGE (ML) INJECTION AS NEEDED
Status: CANCELLED | OUTPATIENT
Start: 2021-10-26

## 2021-09-24 RX ORDER — SODIUM CHLORIDE 0.9 % (FLUSH) 0.9 %
20 SYRINGE (ML) INJECTION AS NEEDED
Status: DISCONTINUED | OUTPATIENT
Start: 2021-09-24 | End: 2021-09-25 | Stop reason: HOSPADM

## 2021-09-24 RX ORDER — HEPARIN SODIUM (PORCINE) LOCK FLUSH IV SOLN 100 UNIT/ML 100 UNIT/ML
500 SOLUTION INTRAVENOUS AS NEEDED
Status: DISCONTINUED | OUTPATIENT
Start: 2021-09-24 | End: 2021-09-25 | Stop reason: HOSPADM

## 2021-09-24 RX ORDER — HEPARIN SODIUM (PORCINE) LOCK FLUSH IV SOLN 100 UNIT/ML 100 UNIT/ML
500 SOLUTION INTRAVENOUS AS NEEDED
Status: CANCELLED | OUTPATIENT
Start: 2021-10-26

## 2021-09-24 RX ADMIN — FULVESTRANT 500 MG: 250 INJECTION INTRAMUSCULAR at 11:07

## 2021-09-24 RX ADMIN — SODIUM CHLORIDE, PRESERVATIVE FREE 20 ML: 5 INJECTION INTRAVENOUS at 09:43

## 2021-09-24 RX ADMIN — HEPARIN SODIUM (PORCINE) LOCK FLUSH IV SOLN 100 UNIT/ML 500 UNITS: 100 SOLUTION at 09:44

## 2021-09-24 NOTE — PROGRESS NOTES
Patient  Sweetie Huitron    Location  [facility] HEMATOLOGY & ONCOLOGY    Chief Complaint  Breast Cancer (-FU)    Referring Provider: TRACY Woo  PCP: Do Garvin APRN    Subjective          Oncology/Hematology History Overview Note   ER+ Left breast cancer:    - diagnosed December 2017 presented with small pleural effusion and nonresectable breast cancer due to involvement of the whole breast, extension to the chest wall and anterior mediastinum with partial destruction of the anterior left second rib and the sternum, left axillary, intramammary and mediastinal adenopathy.            - neoadjuvant treatment with Adriamycin and Cytoxan and radiation    therapy which finished on April 17, 2018.      - PET CT scan done on September 28, 2018 showed decreased size of the left breast with ill-defined trabecular thickening, skin thickening and decreased FDG activity with a maximum SUV of 3.2 consistent with treatment related change previously enlarged hypermetabolic bilateral axillary, mediastinal, intramammary lymphadenopathy is no longer   identified with no abnormal FDG activity seen consistent with treatment change.            - Started on Faslodex February 22, 2018.  Patient had refused additional treatment including Ibrance or Kisqali.            - Repeat CAT scan done in April 17, 2019 showed interval resolution of lingular nodule consistent with resolved focal pneumonia or likely postradiation changes left breast with linear scarring in the apex of the left lung.      - No active disease bilateral screening mammogram done on January 24, 2019 showed markedly decreased size of the left breast which may be post radiation therapy with skin thickening and benign rodlike calcifications.  Bilateral mammogram done in March 19, 2020 showed no mammographic signs of malignancy.        Osteoporosis:  - DEXA scan on 3/19/20: osteoporsis  - currently on Prolia     Malignant neoplasm of  left female breast (CMS/HCC)   2017 Initial Diagnosis    Malignant neoplasm of left female breast (CMS/HCC)     2021 -  Chemotherapy    OP SUPPORTIVE [facility]     2021 -  Chemotherapy    OP CENTRAL VENOUS ACCESS DEVICE ACCESS, CARE, AND MAINTENANCE (CVAD)         History of Present Illness  The pt comes in today for her first done of Faslodex since transferring care.  She has no new complaints or concerns.      Review of Systems   Constitutional: Negative for appetite change, diaphoresis, fatigue, fever, unexpected weight gain and unexpected weight loss.   HENT: Negative for hearing loss, mouth sores, sore throat, swollen glands, trouble swallowing and voice change.    Eyes: Negative for blurred vision.   Respiratory: Negative for cough, shortness of breath and wheezing.    Cardiovascular: Negative for chest pain and palpitations.   Gastrointestinal: Negative for abdominal pain, blood in stool, constipation, diarrhea, nausea and vomiting.   Endocrine: Negative for cold intolerance and heat intolerance.   Genitourinary: Negative for difficulty urinating, dysuria, frequency, hematuria and urinary incontinence.   Musculoskeletal: Negative for arthralgias, back pain and myalgias.   Skin: Negative for rash, skin lesions and bruise.   Neurological: Negative for dizziness, seizures, weakness, numbness and headache.   Hematological: Does not bruise/bleed easily.   Psychiatric/Behavioral: Negative for depressed mood. The patient is not nervous/anxious.        Past Medical History:   Diagnosis Date   • Breast cancer (CMS/HCC)    • Cancer (CMS/HCC)    • Memory change    • Memory loss      Past Surgical History:   Procedure Laterality Date   •  SECTION       Social History     Socioeconomic History   • Marital status:      Spouse name: Not on file   • Number of children: Not on file   • Years of education: Not on file   • Highest education level: Not on file   Tobacco Use   • Smoking status:  Never Smoker   Substance and Sexual Activity   • Alcohol use: Not Currently   • Drug use: Never   • Sexual activity: Defer     Family History   Problem Relation Age of Onset   • Arthritis Other    • Stroke Other    • Heart disease Other        Objective   Physical Exam  General: Alert, cooperative, no acute distress  Eyes: Anicteric sclera, PERRLA  Respiratory: normal respiratory effort  Cardiovascular: no lower extremity edema  Skin: Normal tone, no rash, no lesions  Psychiatric: Appropriate affect, intact judgment  Neurologic: No focal sensory or motor deficits, normal cognition   Musculoskeletal: Normal muscle strength and tone  Extremities: No clubbing, cyanosis, or deformities    Vitals:    09/24/21 1026   BP: 129/61   Pulse: 74   Resp: 16   Temp: 97.8 °F (36.6 °C)   SpO2: 100%   Weight: 51 kg (112 lb 7 oz)   PainSc: 0-No pain     ECOG score: 0         PHQ-9 Total Score:         Result Review :   The following data was reviewed by: Saba Juarez MD PhD on 09/24/2021:  Lab Results   Component Value Date    HGB 13.1 09/24/2021    HCT 39.7 09/24/2021    MCV 92.5 09/24/2021     09/24/2021    WBC 4.29 09/24/2021    NEUTROABS 1.94 09/24/2021    LYMPHSABS 1.88 09/24/2021    MONOSABS 0.27 09/24/2021    EOSABS 0.18 09/24/2021    BASOSABS 0.02 09/24/2021     Lab Results   Component Value Date    GLUCOSE 145 (H) 09/24/2021    BUN 11 09/24/2021    CREATININE 0.80 09/24/2021     09/24/2021    K 3.3 (L) 09/24/2021     09/24/2021    CO2 25.4 09/24/2021    CALCIUM 9.3 09/24/2021    PROTEINTOT 7.0 09/24/2021    ALBUMIN 4.36 09/24/2021    BILITOT 0.5 09/24/2021    ALKPHOS 53 09/24/2021    AST 16 09/24/2021    ALT 9 09/24/2021          Assessment and Plan    Diagnoses and all orders for this visit:    1. Malignant neoplasm of left female breast, unspecified estrogen receptor status, unspecified site of breast (HCC) (Primary)    2. Hypokalemia    I reviewed labs today which are WNL except for potassium which  is 3.3. I recommend the pt increase general nutrition and consider supplemental shakes.  I will monitor monthly. She will get Faslodex today.  I see her again next month and likely then transition to every other month appointments.        Patient was given instructions and counseling regarding her condition or for health maintenance advice. Please see specific information pulled into the AVS if appropriate.     Saba Juarez MD PhD    9/25/2021

## 2021-10-25 PROBLEM — C50.912 MALIGNANT NEOPLASM OF UNSPECIFIED SITE OF LEFT FEMALE BREAST (HCC): Status: ACTIVE | Noted: 2021-10-25

## 2021-10-26 ENCOUNTER — OFFICE VISIT (OUTPATIENT)
Dept: ONCOLOGY | Facility: HOSPITAL | Age: 71
End: 2021-10-26

## 2021-10-26 ENCOUNTER — DOCUMENTATION (OUTPATIENT)
Dept: ONCOLOGY | Facility: HOSPITAL | Age: 71
End: 2021-10-26

## 2021-10-26 ENCOUNTER — LAB (OUTPATIENT)
Dept: ONCOLOGY | Facility: HOSPITAL | Age: 71
End: 2021-10-26

## 2021-10-26 ENCOUNTER — HOSPITAL ENCOUNTER (OUTPATIENT)
Dept: ONCOLOGY | Facility: HOSPITAL | Age: 71
Setting detail: INFUSION SERIES
Discharge: HOME | End: 2021-10-26

## 2021-10-26 VITALS
TEMPERATURE: 97 F | OXYGEN SATURATION: 100 % | BODY MASS INDEX: 22.71 KG/M2 | HEART RATE: 70 BPM | SYSTOLIC BLOOD PRESSURE: 138 MMHG | WEIGHT: 112.43 LBS | DIASTOLIC BLOOD PRESSURE: 70 MMHG | RESPIRATION RATE: 18 BRPM

## 2021-10-26 DIAGNOSIS — C50.912 MALIGNANT NEOPLASM OF LEFT FEMALE BREAST, UNSPECIFIED ESTROGEN RECEPTOR STATUS, UNSPECIFIED SITE OF BREAST (HCC): Primary | ICD-10-CM

## 2021-10-26 DIAGNOSIS — C50.912 MALIGNANT NEOPLASM OF LEFT FEMALE BREAST, UNSPECIFIED ESTROGEN RECEPTOR STATUS, UNSPECIFIED SITE OF BREAST (HCC): ICD-10-CM

## 2021-10-26 LAB
ALBUMIN SERPL-MCNC: 4.7 G/DL (ref 3.5–5.2)
ALBUMIN/GLOB SERPL: 1.7 G/DL
ALP SERPL-CCNC: 57 U/L (ref 39–117)
ALT SERPL W P-5'-P-CCNC: 9 U/L (ref 1–33)
ANION GAP SERPL CALCULATED.3IONS-SCNC: 8.3 MMOL/L (ref 5–15)
AST SERPL-CCNC: 17 U/L (ref 1–32)
BASOPHILS # BLD AUTO: 0.03 10*3/MM3 (ref 0–0.2)
BASOPHILS NFR BLD AUTO: 0.6 % (ref 0–1.5)
BILIRUB SERPL-MCNC: 0.4 MG/DL (ref 0–1.2)
BUN SERPL-MCNC: 8 MG/DL (ref 8–23)
BUN/CREAT SERPL: 12.1 (ref 7–25)
CALCIUM SPEC-SCNC: 9.6 MG/DL (ref 8.6–10.5)
CHLORIDE SERPL-SCNC: 104 MMOL/L (ref 98–107)
CO2 SERPL-SCNC: 25.7 MMOL/L (ref 22–29)
CREAT SERPL-MCNC: 0.66 MG/DL (ref 0.57–1)
DEPRECATED RDW RBC AUTO: 42.7 FL (ref 37–54)
EOSINOPHIL # BLD AUTO: 0.17 10*3/MM3 (ref 0–0.4)
EOSINOPHIL NFR BLD AUTO: 3.1 % (ref 0.3–6.2)
ERYTHROCYTE [DISTWIDTH] IN BLOOD BY AUTOMATED COUNT: 12.8 % (ref 12.3–15.4)
GFR SERPL CREATININE-BSD FRML MDRD: 89 ML/MIN/1.73
GLOBULIN UR ELPH-MCNC: 2.7 GM/DL
GLUCOSE SERPL-MCNC: 98 MG/DL (ref 65–99)
HCT VFR BLD AUTO: 41.3 % (ref 34–46.6)
HGB BLD-MCNC: 13.8 G/DL (ref 12–15.9)
IMM GRANULOCYTES # BLD AUTO: 0.01 10*3/MM3 (ref 0–0.05)
IMM GRANULOCYTES NFR BLD AUTO: 0.2 % (ref 0–0.5)
LYMPHOCYTES # BLD AUTO: 2.17 10*3/MM3 (ref 0.7–3.1)
LYMPHOCYTES NFR BLD AUTO: 39.9 % (ref 19.6–45.3)
MCH RBC QN AUTO: 30.3 PG (ref 26.6–33)
MCHC RBC AUTO-ENTMCNC: 33.4 G/DL (ref 31.5–35.7)
MCV RBC AUTO: 90.6 FL (ref 79–97)
MONOCYTES # BLD AUTO: 0.37 10*3/MM3 (ref 0.1–0.9)
MONOCYTES NFR BLD AUTO: 6.8 % (ref 5–12)
NEUTROPHILS NFR BLD AUTO: 2.69 10*3/MM3 (ref 1.7–7)
NEUTROPHILS NFR BLD AUTO: 49.4 % (ref 42.7–76)
PLATELET # BLD AUTO: 227 10*3/MM3 (ref 140–450)
PMV BLD AUTO: 9.5 FL (ref 6–12)
POTASSIUM SERPL-SCNC: 3.7 MMOL/L (ref 3.5–5.2)
PROT SERPL-MCNC: 7.4 G/DL (ref 6–8.5)
RBC # BLD AUTO: 4.56 10*6/MM3 (ref 3.77–5.28)
SODIUM SERPL-SCNC: 138 MMOL/L (ref 136–145)
WBC # BLD AUTO: 5.44 10*3/MM3 (ref 3.4–10.8)

## 2021-10-26 PROCEDURE — 85025 COMPLETE CBC W/AUTO DIFF WBC: CPT

## 2021-10-26 PROCEDURE — 36415 COLL VENOUS BLD VENIPUNCTURE: CPT

## 2021-10-26 PROCEDURE — 96402 CHEMO HORMON ANTINEOPL SQ/IM: CPT

## 2021-10-26 PROCEDURE — 99214 OFFICE O/P EST MOD 30 MIN: CPT | Performed by: INTERNAL MEDICINE

## 2021-10-26 PROCEDURE — 25010000002 FULVESTRANT PER 25 MG: Performed by: INTERNAL MEDICINE

## 2021-10-26 PROCEDURE — 80053 COMPREHEN METABOLIC PANEL: CPT

## 2021-10-26 RX ORDER — UREA 10 %
1 LOTION (ML) TOPICAL DAILY
COMMUNITY

## 2021-10-26 RX ADMIN — FULVESTRANT 500 MG: 250 INJECTION INTRAMUSCULAR at 12:20

## 2021-10-26 NOTE — PROGRESS NOTES
Diagnosis: Breast cancer    Reason for referral: Breast prosthesis    Comments: OSW assistance requested by pt and clinical RN in obtaining pt a breast prosthesis. Discussed opportunity to refer pt to [facility] or to [facility]. RN completing referral/order to [facility] and will fax once signed. OSW contacted pt via telephone. Spoke with pt's sister and relayed this information. [facility] to contact pt to schedule an appointment to be fitted and order prosthesis. Provided my direct number, encouraging OSW support remains available.    Services/Referrals Provided: Breast prosthesis - [facility]

## 2021-11-08 ENCOUNTER — TELEPHONE (OUTPATIENT)
Dept: NEUROLOGY | Facility: CLINIC | Age: 71
End: 2021-11-08

## 2021-11-08 NOTE — TELEPHONE ENCOUNTER
Provider: DR. MCKNIGHT  Caller: TORI WHYTE  Relationship to Patient: PT'S SISTER.GUARDIAN  Pharmacy: [facility]    Reason for Call: PT'S SISTER.GUARDIAN IS CALLING CAUSE PT IS GETTING UP DURING THE NIGHT (EXAMPLE): WAKING HER UP ASKING TO TAKE HER TO HOSPITAL CAUSE SHE HAD A BABY(CRAZY TALK). THIS IS NOT EVERY SINGLE NIGHT BUT DOES HAPPENED ALOT. SHE IS WANTING TO KNOW IF SOMETHING CAN BE PRESCRIBED FOR HER TO SLEEP AT NIGHT?      PLEASE CALL HER BACK [phone]

## 2021-11-09 NOTE — TELEPHONE ENCOUNTER
Provider: DR. MCKNIGHT   Caller: TORI   Relationship to Patient: PTS SISTER AND GUARDIAN   Phone Number: [phone]  Reason for Call: CALLING BACK TO CHECK ON THIS, DID ADVISE HER THAT SHE COULD CALL PCP TO SEE IF THEY CAN GIVE HER SOMETHING TO HELP HER SLEEP.

## 2021-11-10 NOTE — TELEPHONE ENCOUNTER
Carin aware and verbalized understanding, stated that if PCP was unable to rx medication she would call office to see if she could get appt moved up.

## 2021-11-23 ENCOUNTER — TELEPHONE (OUTPATIENT)
Dept: ONCOLOGY | Facility: HOSPITAL | Age: 71
End: 2021-11-23

## 2021-11-23 ENCOUNTER — HOSPITAL ENCOUNTER (OUTPATIENT)
Dept: ONCOLOGY | Facility: HOSPITAL | Age: 71
Setting detail: INFUSION SERIES
Discharge: HOME | End: 2021-11-23

## 2021-11-23 ENCOUNTER — LAB (OUTPATIENT)
Dept: ONCOLOGY | Facility: HOSPITAL | Age: 71
End: 2021-11-23

## 2021-11-23 VITALS — HEART RATE: 59 BPM | SYSTOLIC BLOOD PRESSURE: 149 MMHG | DIASTOLIC BLOOD PRESSURE: 72 MMHG | RESPIRATION RATE: 18 BRPM

## 2021-11-23 DIAGNOSIS — M81.0 AGE-RELATED OSTEOPOROSIS WITHOUT CURRENT PATHOLOGICAL FRACTURE: ICD-10-CM

## 2021-11-23 DIAGNOSIS — C50.912 MALIGNANT NEOPLASM OF LEFT FEMALE BREAST, UNSPECIFIED ESTROGEN RECEPTOR STATUS, UNSPECIFIED SITE OF BREAST (HCC): Primary | ICD-10-CM

## 2021-11-23 DIAGNOSIS — C50.912 MALIGNANT NEOPLASM OF LEFT FEMALE BREAST, UNSPECIFIED ESTROGEN RECEPTOR STATUS, UNSPECIFIED SITE OF BREAST (HCC): ICD-10-CM

## 2021-11-23 LAB
ALBUMIN SERPL-MCNC: 4.69 G/DL (ref 3.5–5.2)
ALBUMIN/GLOB SERPL: 1.5 G/DL
ALP SERPL-CCNC: 68 U/L (ref 39–117)
ALT SERPL W P-5'-P-CCNC: 11 U/L (ref 1–33)
ANION GAP SERPL CALCULATED.3IONS-SCNC: 13 MMOL/L (ref 5–15)
AST SERPL-CCNC: 16 U/L (ref 1–32)
BASOPHILS # BLD AUTO: 0.04 10*3/MM3 (ref 0–0.2)
BASOPHILS NFR BLD AUTO: 0.9 % (ref 0–1.5)
BILIRUB SERPL-MCNC: 0.6 MG/DL (ref 0–1.2)
BUN SERPL-MCNC: 11 MG/DL (ref 8–23)
BUN/CREAT SERPL: 13.8 (ref 7–25)
CALCIUM SPEC-SCNC: 10.1 MG/DL (ref 8.6–10.5)
CHLORIDE SERPL-SCNC: 100 MMOL/L (ref 98–107)
CO2 SERPL-SCNC: 27 MMOL/L (ref 22–29)
CREAT SERPL-MCNC: 0.8 MG/DL (ref 0.57–1)
DEPRECATED RDW RBC AUTO: 41.9 FL (ref 37–54)
EOSINOPHIL # BLD AUTO: 0.22 10*3/MM3 (ref 0–0.4)
EOSINOPHIL NFR BLD AUTO: 4.9 % (ref 0.3–6.2)
ERYTHROCYTE [DISTWIDTH] IN BLOOD BY AUTOMATED COUNT: 12.4 % (ref 12.3–15.4)
GFR SERPL CREATININE-BSD FRML MDRD: 71 ML/MIN/1.73
GLOBULIN UR ELPH-MCNC: 3.1 GM/DL
GLUCOSE SERPL-MCNC: 92 MG/DL (ref 65–99)
HCT VFR BLD AUTO: 40.8 % (ref 34–46.6)
HGB BLD-MCNC: 13.6 G/DL (ref 12–15.9)
IMM GRANULOCYTES # BLD AUTO: 0.01 10*3/MM3 (ref 0–0.05)
IMM GRANULOCYTES NFR BLD AUTO: 0.2 % (ref 0–0.5)
LYMPHOCYTES # BLD AUTO: 1.96 10*3/MM3 (ref 0.7–3.1)
LYMPHOCYTES NFR BLD AUTO: 43.3 % (ref 19.6–45.3)
MAGNESIUM SERPL-MCNC: 2.2 MG/DL (ref 1.6–2.4)
MCH RBC QN AUTO: 30.2 PG (ref 26.6–33)
MCHC RBC AUTO-ENTMCNC: 33.3 G/DL (ref 31.5–35.7)
MCV RBC AUTO: 90.7 FL (ref 79–97)
MONOCYTES # BLD AUTO: 0.32 10*3/MM3 (ref 0.1–0.9)
MONOCYTES NFR BLD AUTO: 7.1 % (ref 5–12)
NEUTROPHILS NFR BLD AUTO: 1.98 10*3/MM3 (ref 1.7–7)
NEUTROPHILS NFR BLD AUTO: 43.6 % (ref 42.7–76)
PHOSPHATE SERPL-MCNC: 3.6 MG/DL (ref 2.5–4.5)
PLATELET # BLD AUTO: 262 10*3/MM3 (ref 140–450)
PMV BLD AUTO: 9.1 FL (ref 6–12)
POTASSIUM SERPL-SCNC: 3.6 MMOL/L (ref 3.5–5.2)
PROT SERPL-MCNC: 7.8 G/DL (ref 6–8.5)
RBC # BLD AUTO: 4.5 10*6/MM3 (ref 3.77–5.28)
SODIUM SERPL-SCNC: 140 MMOL/L (ref 136–145)
WBC NRBC COR # BLD: 4.53 10*3/MM3 (ref 3.4–10.8)

## 2021-11-23 PROCEDURE — 96372 THER/PROPH/DIAG INJ SC/IM: CPT

## 2021-11-23 PROCEDURE — 80053 COMPREHEN METABOLIC PANEL: CPT

## 2021-11-23 PROCEDURE — 96402 CHEMO HORMON ANTINEOPL SQ/IM: CPT

## 2021-11-23 PROCEDURE — 83735 ASSAY OF MAGNESIUM: CPT

## 2021-11-23 PROCEDURE — 84100 ASSAY OF PHOSPHORUS: CPT

## 2021-11-23 PROCEDURE — 25010000002 DENOSUMAB 60 MG/ML SOLUTION PREFILLED SYRINGE: Performed by: INTERNAL MEDICINE

## 2021-11-23 PROCEDURE — 36415 COLL VENOUS BLD VENIPUNCTURE: CPT

## 2021-11-23 PROCEDURE — 25010000002 FULVESTRANT PER 25 MG: Performed by: INTERNAL MEDICINE

## 2021-11-23 PROCEDURE — 85025 COMPLETE CBC W/AUTO DIFF WBC: CPT

## 2021-11-23 RX ADMIN — DENOSUMAB 60 MG: 60 INJECTION SUBCUTANEOUS at 11:58

## 2021-11-23 RX ADMIN — FULVESTRANT 500 MG: 50 INJECTION INTRAMUSCULAR at 11:59

## 2021-11-23 NOTE — TELEPHONE ENCOUNTER
CALLER: SOM      RELATIONSHIP: [facility] IN [place]    BEST NUMBER TO CALL BACK : [phone]      CALL REGARDING: CALLING REGARDING RECV FAX 10/26 ON [facility]  DIDN'T RECOGNIZE PROVIDER SIGNATURE WANTED TO CLARIFY WHAT PROVIDER PT SEE ON 10/26    I ADVISED TEETEE HAD SEEN DR CHELLY KIMBALL ON 10/26 AND SOM PLASCENCIA/CLEO

## 2021-12-20 ENCOUNTER — TELEPHONE (OUTPATIENT)
Dept: NEUROLOGY | Facility: CLINIC | Age: 71
End: 2021-12-20

## 2021-12-21 ENCOUNTER — HOSPITAL ENCOUNTER (OUTPATIENT)
Dept: ONCOLOGY | Facility: HOSPITAL | Age: 71
Setting detail: INFUSION SERIES
Discharge: HOME | End: 2021-12-21

## 2021-12-21 ENCOUNTER — LAB (OUTPATIENT)
Dept: ONCOLOGY | Facility: HOSPITAL | Age: 71
End: 2021-12-21

## 2021-12-21 ENCOUNTER — OFFICE VISIT (OUTPATIENT)
Dept: ONCOLOGY | Facility: HOSPITAL | Age: 71
End: 2021-12-21

## 2021-12-21 VITALS
RESPIRATION RATE: 18 BRPM | TEMPERATURE: 98.2 F | SYSTOLIC BLOOD PRESSURE: 136 MMHG | HEART RATE: 60 BPM | WEIGHT: 119.71 LBS | BODY MASS INDEX: 24.18 KG/M2 | DIASTOLIC BLOOD PRESSURE: 73 MMHG | OXYGEN SATURATION: 98 %

## 2021-12-21 DIAGNOSIS — C50.912 MALIGNANT NEOPLASM OF LEFT FEMALE BREAST, UNSPECIFIED ESTROGEN RECEPTOR STATUS, UNSPECIFIED SITE OF BREAST (HCC): Primary | ICD-10-CM

## 2021-12-21 DIAGNOSIS — C79.51 METASTASIS TO BONE (HCC): ICD-10-CM

## 2021-12-21 DIAGNOSIS — G30.9 ALZHEIMER'S DISEASE (HCC): ICD-10-CM

## 2021-12-21 DIAGNOSIS — C50.912 MALIGNANT NEOPLASM OF LEFT FEMALE BREAST, UNSPECIFIED ESTROGEN RECEPTOR STATUS, UNSPECIFIED SITE OF BREAST (HCC): ICD-10-CM

## 2021-12-21 DIAGNOSIS — F02.80 ALZHEIMER'S DISEASE (HCC): ICD-10-CM

## 2021-12-21 PROBLEM — E53.8 B12 DEFICIENCY: Status: ACTIVE | Noted: 2021-11-22

## 2021-12-21 LAB
ALBUMIN SERPL-MCNC: 4.48 G/DL (ref 3.5–5.2)
ALBUMIN/GLOB SERPL: 1.6 G/DL
ALP SERPL-CCNC: 61 U/L (ref 39–117)
ALT SERPL W P-5'-P-CCNC: 10 U/L (ref 1–33)
ANION GAP SERPL CALCULATED.3IONS-SCNC: 7.9 MMOL/L (ref 5–15)
AST SERPL-CCNC: 14 U/L (ref 1–32)
BASOPHILS # BLD AUTO: 0.03 10*3/MM3 (ref 0–0.2)
BASOPHILS NFR BLD AUTO: 0.6 % (ref 0–1.5)
BILIRUB SERPL-MCNC: 0.4 MG/DL (ref 0–1.2)
BUN SERPL-MCNC: 13 MG/DL (ref 8–23)
BUN/CREAT SERPL: 16.9 (ref 7–25)
CALCIUM SPEC-SCNC: 9.5 MG/DL (ref 8.6–10.5)
CHLORIDE SERPL-SCNC: 102 MMOL/L (ref 98–107)
CO2 SERPL-SCNC: 28.1 MMOL/L (ref 22–29)
CREAT SERPL-MCNC: 0.77 MG/DL (ref 0.57–1)
DEPRECATED RDW RBC AUTO: 44.2 FL (ref 37–54)
EOSINOPHIL # BLD AUTO: 0.2 10*3/MM3 (ref 0–0.4)
EOSINOPHIL NFR BLD AUTO: 3.8 % (ref 0.3–6.2)
ERYTHROCYTE [DISTWIDTH] IN BLOOD BY AUTOMATED COUNT: 13 % (ref 12.3–15.4)
GFR SERPL CREATININE-BSD FRML MDRD: 74 ML/MIN/1.73
GLOBULIN UR ELPH-MCNC: 2.7 GM/DL
GLUCOSE SERPL-MCNC: 85 MG/DL (ref 65–99)
HCT VFR BLD AUTO: 40 % (ref 34–46.6)
HGB BLD-MCNC: 12.9 G/DL (ref 12–15.9)
IMM GRANULOCYTES # BLD AUTO: 0 10*3/MM3 (ref 0–0.05)
IMM GRANULOCYTES NFR BLD AUTO: 0 % (ref 0–0.5)
LYMPHOCYTES # BLD AUTO: 2.13 10*3/MM3 (ref 0.7–3.1)
LYMPHOCYTES NFR BLD AUTO: 40.3 % (ref 19.6–45.3)
MCH RBC QN AUTO: 29.6 PG (ref 26.6–33)
MCHC RBC AUTO-ENTMCNC: 32.3 G/DL (ref 31.5–35.7)
MCV RBC AUTO: 91.7 FL (ref 79–97)
MONOCYTES # BLD AUTO: 0.39 10*3/MM3 (ref 0.1–0.9)
MONOCYTES NFR BLD AUTO: 7.4 % (ref 5–12)
NEUTROPHILS NFR BLD AUTO: 2.54 10*3/MM3 (ref 1.7–7)
NEUTROPHILS NFR BLD AUTO: 47.9 % (ref 42.7–76)
PLATELET # BLD AUTO: 229 10*3/MM3 (ref 140–450)
PMV BLD AUTO: 9.1 FL (ref 6–12)
POTASSIUM SERPL-SCNC: 4.1 MMOL/L (ref 3.5–5.2)
PROT SERPL-MCNC: 7.2 G/DL (ref 6–8.5)
RBC # BLD AUTO: 4.36 10*6/MM3 (ref 3.77–5.28)
SODIUM SERPL-SCNC: 138 MMOL/L (ref 136–145)
WBC NRBC COR # BLD: 5.29 10*3/MM3 (ref 3.4–10.8)

## 2021-12-21 PROCEDURE — 36415 COLL VENOUS BLD VENIPUNCTURE: CPT

## 2021-12-21 PROCEDURE — 96402 CHEMO HORMON ANTINEOPL SQ/IM: CPT

## 2021-12-21 PROCEDURE — 99214 OFFICE O/P EST MOD 30 MIN: CPT | Performed by: INTERNAL MEDICINE

## 2021-12-21 PROCEDURE — 25010000002 FULVESTRANT PER 25 MG: Performed by: INTERNAL MEDICINE

## 2021-12-21 PROCEDURE — 80053 COMPREHEN METABOLIC PANEL: CPT

## 2021-12-21 PROCEDURE — 85025 COMPLETE CBC W/AUTO DIFF WBC: CPT

## 2021-12-21 RX ORDER — QUETIAPINE FUMARATE 25 MG/1
150 TABLET, FILM COATED ORAL NIGHTLY
COMMUNITY
Start: 2021-12-08

## 2021-12-21 RX ORDER — LORATADINE 10 MG/1
10 TABLET ORAL DAILY
COMMUNITY

## 2021-12-21 RX ADMIN — FULVESTRANT 500 MG: 250 INJECTION INTRAMUSCULAR at 14:22

## 2021-12-21 NOTE — PROGRESS NOTES
Patient  Sweetie Huitron    Location  [facility] HEMATOLOGY & ONCOLOGY    Chief Complaint  Breast Cancer (-F1)    Referring Provider: TRACY Woo  PCP: Do Garvin APRN    Subjective          Oncology/Hematology History Overview Note     ER+ Left breast cancer:    - prior treatment by Dr. Guerrero and Dr. Bell     - diagnosed December 2017 presented with small pleural effusion and nonresectable breast cancer due to involvement of the whole breast, extension to the chest wall and anterior mediastinum with partial destruction of the anterior left second rib and the sternum, left axillary, intramammary and mediastinal adenopathy.            - neoadjuvant treatment with Adriamycin and Cytoxan and radiation therapy which finished on April 17, 2018.      - PET CT scan done on September 28, 2018 showed decreased size of the left breast with ill-defined trabecular thickening, skin thickening and decreased FDG activity with a maximum SUV of 3.2 consistent with treatment related change previously enlarged hypermetabolic bilateral axillary, mediastinal, intramammary lymphadenopathy is no longer   identified with no abnormal FDG activity seen consistent with treatment change.        - Underwent left mastectomy.        - Started on Faslodex February 22, 2018.  Patient had refused additional treatment including Ibrance or Kisqali.            - Repeat CAT scan done in April 17, 2019 showed interval resolution of lingular nodule consistent with resolved focal pneumonia or likely postradiation changes left breast with linear scarring in the apex of the left lung.      - No active disease bilateral screening mammogram done on January 24, 2019 showed markedly decreased size of the left breast which may be post radiation therapy with skin thickening and benign rodlike calcifications.  Bilateral mammogram done in March 19, 2020 showed no mammographic signs of malignancy.         Osteoporosis:  - DEXA scan on 3/19/20: osteoporsis  - currently on Prolia    Alzheimer's Dementia:  - impaired congnition  - sister helps with health care appointments     Malignant neoplasm of left female breast (HCC)   12/8/2017 Initial Diagnosis    Malignant neoplasm of left female breast (CMS/HCC)     8/26/2021 -  Chemotherapy    OP SUPPORTIVE Fulvestrant     9/24/2021 -  Chemotherapy    OP CENTRAL VENOUS ACCESS DEVICE ACCESS, CARE, AND MAINTENANCE (CVAD)     Malignant neoplasm of unspecified site of left female breast (HCC)   10/25/2021 Initial Diagnosis    Malignant neoplasm of unspecified site of left female breast (HCC)     11/23/2021 -  Chemotherapy    OP SUPPORTIVE Denosumab (Prolia) Q6M         History of Present Illness  The pt comes in today for her next injection of fulvestrant. I tried to discuss any prior images with the patient and she could not recall treatment details.  She was alone at the visit today. Her sister dropped her off. She has no complaints and denies pain, weight loss or other symptoms.     Review of Systems   Constitutional: Negative for appetite change, diaphoresis, fever, unexpected weight gain and unexpected weight loss.   HENT: Negative for hearing loss, sore throat and voice change.    Eyes: Negative for blurred vision, double vision, pain, redness and visual disturbance.   Respiratory: Negative for cough, shortness of breath and wheezing.    Cardiovascular: Negative for chest pain, palpitations and leg swelling.   Endocrine: Negative for cold intolerance, heat intolerance, polydipsia and polyuria.   Genitourinary: Negative for decreased urine volume, difficulty urinating, frequency and urinary incontinence.   Musculoskeletal: Negative for arthralgias, back pain, joint swelling and myalgias.   Skin: Negative for color change, rash, skin lesions and bruise.   Neurological: Negative for dizziness, seizures, numbness and headache.   Hematological: Negative for adenopathy. Does  not bruise/bleed easily.   Psychiatric/Behavioral: Negative for depressed mood. The patient is not nervous/anxious.    All other systems reviewed and are negative.      Past Medical History:   Diagnosis Date   • Breast cancer (HCC)    • Cancer (HCC)    • Memory change    • Memory loss      Past Surgical History:   Procedure Laterality Date   •  SECTION       Social History     Socioeconomic History   • Marital status:    Tobacco Use   • Smoking status: Never Smoker   Substance and Sexual Activity   • Alcohol use: Not Currently   • Drug use: Never   • Sexual activity: Defer     Family History   Problem Relation Age of Onset   • Arthritis Other    • Stroke Other    • Heart disease Other        Objective   Physical Exam  General: Alert, cooperative, no acute distress  Eyes: Anicteric sclera, PERRLA  Respiratory: normal respiratory effort  Cardiovascular: no lower extremity edema  Abdomen: no pain with palpation  Skin: Normal tone, no rash, no lesions  Psychiatric: Appropriate affect, intact judgment  Neurologic: No focal sensory or motor deficits, poor memory/recall  Musculoskeletal: Normal muscle strength and tone  Extremities: No clubbing, cyanosis, or deformities    Vitals:    21 1327   BP: 136/73   Pulse: 60   Resp: 18   Temp: 98.2 °F (36.8 °C)   SpO2: 98%   Weight: 54.3 kg (119 lb 11.4 oz)   PainSc: 0-No pain     ECOG score: 0         PHQ-9 Total Score:         Result Review :   The following data was reviewed by: Saba Juarez MD PhD on 2021:  Lab Results   Component Value Date    HGB 12.9 2021    HCT 40.0 2021    MCV 91.7 2021     2021    WBC 5.29 2021    NEUTROABS 2.54 2021    LYMPHSABS 2.13 2021    MONOSABS 0.39 2021    EOSABS 0.20 2021    BASOSABS 0.03 2021     Lab Results   Component Value Date    GLUCOSE 85 2021    BUN 13 2021    CREATININE 0.77 2021     2021    K 4.1 2021      12/21/2021    CO2 28.1 12/21/2021    CALCIUM 9.5 12/21/2021    PROTEINTOT 7.2 12/21/2021    ALBUMIN 4.48 12/21/2021    BILITOT 0.4 12/21/2021    ALKPHOS 61 12/21/2021    AST 14 12/21/2021    ALT 10 12/21/2021          Assessment and Plan    Diagnoses and all orders for this visit:    1. Malignant neoplasm of left female breast, unspecified estrogen receptor status, unspecified site of breast (HCC) (Primary)  -     CT chest w contrast; Future  -     CT abdomen pelvis w contrast; Future  -     NM Bone Scan Whole Body; Future    2. Metastasis to bone (HCC)    3. Alzheimer's disease (HCC)      Metastatic Breast Cancer to bone: Pt is doing well on Faslodex and prolia. Labs and exam today show no evidence of toxicity.  I do not see any record of recent imaging so I will order CT CAP with contrast and bone scan prior to the next appt.     Alzheimer's dementia: appears to be mild. Patient does not recall history of treatment but does participate in her care and answers questions appropriately.         Patient was given instructions and counseling regarding her condition or for health maintenance advice. Please see specific information pulled into the AVS if appropriate.     Saba Juarez MD PhD    12/21/2021

## 2021-12-28 ENCOUNTER — OFFICE VISIT (OUTPATIENT)
Dept: NEUROLOGY | Facility: CLINIC | Age: 71
End: 2021-12-28

## 2021-12-28 VITALS
BODY MASS INDEX: 24.19 KG/M2 | SYSTOLIC BLOOD PRESSURE: 128 MMHG | HEIGHT: 59 IN | HEART RATE: 72 BPM | WEIGHT: 120 LBS | DIASTOLIC BLOOD PRESSURE: 76 MMHG

## 2021-12-28 DIAGNOSIS — F02.80 ALZHEIMER'S DISEASE (HCC): Primary | ICD-10-CM

## 2021-12-28 DIAGNOSIS — G30.9 ALZHEIMER'S DISEASE (HCC): Primary | ICD-10-CM

## 2021-12-28 PROCEDURE — 99213 OFFICE O/P EST LOW 20 MIN: CPT | Performed by: PSYCHIATRY & NEUROLOGY

## 2021-12-28 NOTE — ASSESSMENT & PLAN NOTE
I discussed with her sister that continue taking Donepezil 10 mg daily.  Discussed with the sister and the patient that she is incapable of making decisions such as signing over her house and property  to her daughter.  The sister is hearing legal action against patient's daughter and I support decision.    In regards to her delusions I discussed with the patient sister that Seroquel may help the patient however she should be informed that there is a black box warning about antipsychotic medications increasing the risk of death with patients with dementia related psychoses.  She is also at risk for falls and cardiovascular events.  She will contact the primary care provider.  I will see the patient again in 8 months time for follow-up.

## 2021-12-28 NOTE — PROGRESS NOTES
"Chief Complaint  Neurologic Problem    Subjective          Sweetie Huitron is a 71 y.o. female who presents to [facility] NEUROLOGY & NEUROSURGERY  History of Present Illness  71-year-old man here for follow for Alzheimer's dementia.  Her sister is with her today.  Patient now lives with her sister as well as her 93-year-old father.  According to the sister the patient is independent with activities of daily living but she has to remind her when to dress, went to bathe.  The patient has delusions of having sex with Maxim Wright the quarterback and thinks that they have a son together by the name of Jaime Munroe.  She tells her sister to unlock the door to see Maxim Wright and it usually at night.  She wants to get out of the door at night.  She is sleeping with her 93-year-old father to keep an eye on her.  Recently she was given Seroquel 25 mg by her primary care.  Her sister wants it to be increased because she is not sleeping well.  She also drinks a lot of caffeine.  She drinks 6 Cokes a day.  She wants to know if she can get her diet Coke.    The sister tells me that they found out that her 33-year-old daughter had the patient sign her house and 15 acres in her name.  She was told by the patient's sister that the patient was demented and was diagnosed in January.  She has another child who is a 34-year-old son.    Objective   Vital Signs:   /76   Pulse 72   Ht 149.9 cm (59.02\")   Wt 54.4 kg (120 lb)   BMI 24.22 kg/m²     Physical Exam   She is alert, fluent, phasic, she is not talking much.  She is able to follow commands.   Station gait she is able to tiptoe, heel walk, neftali without any difficulty.  Heart is regular rhythm normal in rate.        Assessment and Plan  Diagnoses and all orders for this visit:    1. Alzheimer's disease (HCC) (Primary)  Assessment & Plan:  I discussed with her sister that continue taking Donepezil 10 mg daily.  Discussed with the sister and the patient that " she is incapable of making decisions such as signing over her house and property  to her daughter.  The sister is hearing legal action against patient's daughter and I support decision.    In regards to her delusions I discussed with the patient sister that Seroquel may help the patient however she should be informed that there is a black box warning about antipsychotic medications increasing the risk of death with patients with dementia related psychoses.  She is also at risk for falls and cardiovascular events.  She will contact the primary care provider.  I will see the patient again in 8 months time for follow-up.         Total time spent with the patient and coordinating patient care was 25 minutes.    Follow Up  No follow-ups on file.  Patient was given instructions and counseling regarding her condition or for health maintenance advice. Please see specific information pulled into the AVS if appropriate.

## 2022-01-18 ENCOUNTER — LAB (OUTPATIENT)
Dept: ONCOLOGY | Facility: HOSPITAL | Age: 72
End: 2022-01-18

## 2022-01-18 ENCOUNTER — HOSPITAL ENCOUNTER (OUTPATIENT)
Dept: ONCOLOGY | Facility: HOSPITAL | Age: 72
Setting detail: INFUSION SERIES
Discharge: HOME | End: 2022-01-18

## 2022-01-18 DIAGNOSIS — C50.912 MALIGNANT NEOPLASM OF LEFT FEMALE BREAST, UNSPECIFIED ESTROGEN RECEPTOR STATUS, UNSPECIFIED SITE OF BREAST: Primary | ICD-10-CM

## 2022-01-18 DIAGNOSIS — M81.0 AGE-RELATED OSTEOPOROSIS WITHOUT CURRENT PATHOLOGICAL FRACTURE: ICD-10-CM

## 2022-01-18 DIAGNOSIS — C50.912 MALIGNANT NEOPLASM OF LEFT FEMALE BREAST, UNSPECIFIED ESTROGEN RECEPTOR STATUS, UNSPECIFIED SITE OF BREAST: ICD-10-CM

## 2022-01-18 LAB
ALBUMIN SERPL-MCNC: 4.34 G/DL (ref 3.5–5.2)
ALBUMIN/GLOB SERPL: 1.6 G/DL
ALP SERPL-CCNC: 60 U/L (ref 39–117)
ALT SERPL W P-5'-P-CCNC: 20 U/L (ref 1–33)
ANION GAP SERPL CALCULATED.3IONS-SCNC: 7.1 MMOL/L (ref 5–15)
AST SERPL-CCNC: 19 U/L (ref 1–32)
BASOPHILS # BLD AUTO: 0.02 10*3/MM3 (ref 0–0.2)
BASOPHILS NFR BLD AUTO: 0.4 % (ref 0–1.5)
BILIRUB SERPL-MCNC: 0.3 MG/DL (ref 0–1.2)
BUN SERPL-MCNC: 11 MG/DL (ref 8–23)
BUN/CREAT SERPL: 15.1 (ref 7–25)
CALCIUM SPEC-SCNC: 9.4 MG/DL (ref 8.6–10.5)
CHLORIDE SERPL-SCNC: 104 MMOL/L (ref 98–107)
CO2 SERPL-SCNC: 28.9 MMOL/L (ref 22–29)
CREAT SERPL-MCNC: 0.73 MG/DL (ref 0.57–1)
DEPRECATED RDW RBC AUTO: 44.8 FL (ref 37–54)
EOSINOPHIL # BLD AUTO: 0.16 10*3/MM3 (ref 0–0.4)
EOSINOPHIL NFR BLD AUTO: 3.3 % (ref 0.3–6.2)
ERYTHROCYTE [DISTWIDTH] IN BLOOD BY AUTOMATED COUNT: 13.1 % (ref 12.3–15.4)
GFR SERPL CREATININE-BSD FRML MDRD: 79 ML/MIN/1.73
GLOBULIN UR ELPH-MCNC: 2.8 GM/DL
GLUCOSE SERPL-MCNC: 116 MG/DL (ref 65–99)
HCT VFR BLD AUTO: 39.4 % (ref 34–46.6)
HGB BLD-MCNC: 12.6 G/DL (ref 12–15.9)
IMM GRANULOCYTES # BLD AUTO: 0 10*3/MM3 (ref 0–0.05)
IMM GRANULOCYTES NFR BLD AUTO: 0 % (ref 0–0.5)
LYMPHOCYTES # BLD AUTO: 1.72 10*3/MM3 (ref 0.7–3.1)
LYMPHOCYTES NFR BLD AUTO: 35.3 % (ref 19.6–45.3)
MCH RBC QN AUTO: 29.6 PG (ref 26.6–33)
MCHC RBC AUTO-ENTMCNC: 32 G/DL (ref 31.5–35.7)
MCV RBC AUTO: 92.7 FL (ref 79–97)
MONOCYTES # BLD AUTO: 0.34 10*3/MM3 (ref 0.1–0.9)
MONOCYTES NFR BLD AUTO: 7 % (ref 5–12)
NEUTROPHILS NFR BLD AUTO: 2.63 10*3/MM3 (ref 1.7–7)
NEUTROPHILS NFR BLD AUTO: 54 % (ref 42.7–76)
PLATELET # BLD AUTO: 215 10*3/MM3 (ref 140–450)
PMV BLD AUTO: 9.1 FL (ref 6–12)
POTASSIUM SERPL-SCNC: 4.1 MMOL/L (ref 3.5–5.2)
PROT SERPL-MCNC: 7.1 G/DL (ref 6–8.5)
RBC # BLD AUTO: 4.25 10*6/MM3 (ref 3.77–5.28)
SODIUM SERPL-SCNC: 140 MMOL/L (ref 136–145)
WBC NRBC COR # BLD: 4.87 10*3/MM3 (ref 3.4–10.8)

## 2022-01-18 PROCEDURE — 25010000002 FULVESTRANT PER 25 MG: Performed by: INTERNAL MEDICINE

## 2022-01-18 PROCEDURE — 36415 COLL VENOUS BLD VENIPUNCTURE: CPT

## 2022-01-18 PROCEDURE — 85025 COMPLETE CBC W/AUTO DIFF WBC: CPT

## 2022-01-18 PROCEDURE — 80053 COMPREHEN METABOLIC PANEL: CPT

## 2022-01-18 PROCEDURE — 96402 CHEMO HORMON ANTINEOPL SQ/IM: CPT

## 2022-01-18 RX ADMIN — FULVESTRANT 500 MG: 250 INJECTION INTRAMUSCULAR at 14:24

## 2022-01-19 ENCOUNTER — HOSPITAL ENCOUNTER (OUTPATIENT)
Dept: CT IMAGING | Facility: HOSPITAL | Age: 72
Discharge: HOME | End: 2022-01-19

## 2022-01-19 ENCOUNTER — HOSPITAL ENCOUNTER (OUTPATIENT)
Dept: NUCLEAR MEDICINE | Facility: HOSPITAL | Age: 72
Discharge: HOME | End: 2022-01-19

## 2022-01-19 DIAGNOSIS — C50.912 MALIGNANT NEOPLASM OF LEFT FEMALE BREAST, UNSPECIFIED ESTROGEN RECEPTOR STATUS, UNSPECIFIED SITE OF BREAST: ICD-10-CM

## 2022-01-19 PROCEDURE — 0 TECHNETIUM MEDRONATE KIT: Performed by: INTERNAL MEDICINE

## 2022-01-19 PROCEDURE — 74177 CT ABD & PELVIS W/CONTRAST: CPT

## 2022-01-19 PROCEDURE — 71260 CT THORAX DX C+: CPT

## 2022-01-19 PROCEDURE — 78306 BONE IMAGING WHOLE BODY: CPT

## 2022-01-19 PROCEDURE — A9503 TC99M MEDRONATE: HCPCS | Performed by: INTERNAL MEDICINE

## 2022-01-19 PROCEDURE — 0 IOPAMIDOL PER 1 ML: Performed by: INTERNAL MEDICINE

## 2022-01-19 RX ORDER — TC 99M MEDRONATE 20 MG/10ML
21 INJECTION, POWDER, LYOPHILIZED, FOR SOLUTION INTRAVENOUS
Status: COMPLETED | OUTPATIENT
Start: 2022-01-19 | End: 2022-01-19

## 2022-01-19 RX ADMIN — TC 99M MEDRONATE 21 MILLICURIE: 20 INJECTION, POWDER, LYOPHILIZED, FOR SOLUTION INTRAVENOUS at 09:00

## 2022-01-19 RX ADMIN — IOPAMIDOL 100 ML: 755 INJECTION, SOLUTION INTRAVENOUS at 09:27

## 2022-01-25 ENCOUNTER — OFFICE VISIT (OUTPATIENT)
Dept: ONCOLOGY | Facility: HOSPITAL | Age: 72
End: 2022-01-25

## 2022-01-25 VITALS
BODY MASS INDEX: 24.74 KG/M2 | RESPIRATION RATE: 18 BRPM | DIASTOLIC BLOOD PRESSURE: 53 MMHG | OXYGEN SATURATION: 100 % | WEIGHT: 122.58 LBS | HEART RATE: 66 BPM | TEMPERATURE: 97.6 F | SYSTOLIC BLOOD PRESSURE: 134 MMHG

## 2022-01-25 DIAGNOSIS — C50.912 MALIGNANT NEOPLASM OF LEFT FEMALE BREAST, UNSPECIFIED ESTROGEN RECEPTOR STATUS, UNSPECIFIED SITE OF BREAST: ICD-10-CM

## 2022-01-25 PROCEDURE — 99214 OFFICE O/P EST MOD 30 MIN: CPT | Performed by: INTERNAL MEDICINE

## 2022-01-25 PROCEDURE — G0463 HOSPITAL OUTPT CLINIC VISIT: HCPCS | Performed by: INTERNAL MEDICINE

## 2022-01-25 RX ORDER — CETIRIZINE HYDROCHLORIDE 10 MG/1
10 TABLET ORAL DAILY
COMMUNITY
Start: 2022-01-07 | End: 2023-01-08

## 2022-01-25 RX ORDER — QUETIAPINE FUMARATE 50 MG/1
TABLET, FILM COATED ORAL
COMMUNITY
Start: 2022-01-22 | End: 2022-10-11 | Stop reason: SDUPTHER

## 2022-02-15 ENCOUNTER — LAB (OUTPATIENT)
Dept: ONCOLOGY | Facility: HOSPITAL | Age: 72
End: 2022-02-15

## 2022-02-15 ENCOUNTER — HOSPITAL ENCOUNTER (OUTPATIENT)
Dept: ONCOLOGY | Facility: HOSPITAL | Age: 72
Setting detail: INFUSION SERIES
Discharge: HOME | End: 2022-02-15

## 2022-02-15 ENCOUNTER — HOSPITAL ENCOUNTER (OUTPATIENT)
Dept: ONCOLOGY | Facility: HOSPITAL | Age: 72
Setting detail: INFUSION SERIES
End: 2022-02-15

## 2022-02-15 VITALS
RESPIRATION RATE: 18 BRPM | OXYGEN SATURATION: 100 % | SYSTOLIC BLOOD PRESSURE: 139 MMHG | HEART RATE: 75 BPM | TEMPERATURE: 97.5 F | DIASTOLIC BLOOD PRESSURE: 68 MMHG

## 2022-02-15 DIAGNOSIS — E53.8 B12 DEFICIENCY: ICD-10-CM

## 2022-02-15 DIAGNOSIS — C50.912 MALIGNANT NEOPLASM OF LEFT FEMALE BREAST, UNSPECIFIED ESTROGEN RECEPTOR STATUS, UNSPECIFIED SITE OF BREAST: Primary | ICD-10-CM

## 2022-02-15 LAB
ALBUMIN SERPL-MCNC: 4.46 G/DL (ref 3.5–5.2)
ALBUMIN/GLOB SERPL: 1.6 G/DL
ALP SERPL-CCNC: 61 U/L (ref 39–117)
ALT SERPL W P-5'-P-CCNC: 11 U/L (ref 1–33)
ANION GAP SERPL CALCULATED.3IONS-SCNC: 8 MMOL/L (ref 5–15)
AST SERPL-CCNC: 16 U/L (ref 1–32)
BASOPHILS # BLD AUTO: 0.03 10*3/MM3 (ref 0–0.2)
BASOPHILS NFR BLD AUTO: 0.7 % (ref 0–1.5)
BILIRUB SERPL-MCNC: 0.3 MG/DL (ref 0–1.2)
BUN SERPL-MCNC: 11 MG/DL (ref 8–23)
BUN/CREAT SERPL: 14.7 (ref 7–25)
CALCIUM SPEC-SCNC: 9.5 MG/DL (ref 8.6–10.5)
CHLORIDE SERPL-SCNC: 103 MMOL/L (ref 98–107)
CO2 SERPL-SCNC: 27 MMOL/L (ref 22–29)
CREAT SERPL-MCNC: 0.75 MG/DL (ref 0.57–1)
DEPRECATED RDW RBC AUTO: 44.8 FL (ref 37–54)
EOSINOPHIL # BLD AUTO: 0.15 10*3/MM3 (ref 0–0.4)
EOSINOPHIL NFR BLD AUTO: 3.6 % (ref 0.3–6.2)
ERYTHROCYTE [DISTWIDTH] IN BLOOD BY AUTOMATED COUNT: 13.3 % (ref 12.3–15.4)
GFR SERPL CREATININE-BSD FRML MDRD: 76 ML/MIN/1.73
GLOBULIN UR ELPH-MCNC: 2.7 GM/DL
GLUCOSE SERPL-MCNC: 101 MG/DL (ref 65–99)
HCT VFR BLD AUTO: 39.3 % (ref 34–46.6)
HGB BLD-MCNC: 13 G/DL (ref 12–15.9)
IMM GRANULOCYTES # BLD AUTO: 0 10*3/MM3 (ref 0–0.05)
IMM GRANULOCYTES NFR BLD AUTO: 0 % (ref 0–0.5)
LYMPHOCYTES # BLD AUTO: 1.74 10*3/MM3 (ref 0.7–3.1)
LYMPHOCYTES NFR BLD AUTO: 42.2 % (ref 19.6–45.3)
MCH RBC QN AUTO: 30.2 PG (ref 26.6–33)
MCHC RBC AUTO-ENTMCNC: 33.1 G/DL (ref 31.5–35.7)
MCV RBC AUTO: 91.4 FL (ref 79–97)
MONOCYTES # BLD AUTO: 0.34 10*3/MM3 (ref 0.1–0.9)
MONOCYTES NFR BLD AUTO: 8.3 % (ref 5–12)
NEUTROPHILS NFR BLD AUTO: 1.86 10*3/MM3 (ref 1.7–7)
NEUTROPHILS NFR BLD AUTO: 45.2 % (ref 42.7–76)
PLATELET # BLD AUTO: 232 10*3/MM3 (ref 140–450)
PMV BLD AUTO: 9 FL (ref 6–12)
POTASSIUM SERPL-SCNC: 4 MMOL/L (ref 3.5–5.2)
PROT SERPL-MCNC: 7.2 G/DL (ref 6–8.5)
RBC # BLD AUTO: 4.3 10*6/MM3 (ref 3.77–5.28)
SODIUM SERPL-SCNC: 138 MMOL/L (ref 136–145)
WBC NRBC COR # BLD: 4.12 10*3/MM3 (ref 3.4–10.8)

## 2022-02-15 PROCEDURE — 80053 COMPREHEN METABOLIC PANEL: CPT | Performed by: INTERNAL MEDICINE

## 2022-02-15 PROCEDURE — 25010000002 FULVESTRANT PER 25 MG: Performed by: INTERNAL MEDICINE

## 2022-02-15 PROCEDURE — 85025 COMPLETE CBC W/AUTO DIFF WBC: CPT | Performed by: INTERNAL MEDICINE

## 2022-02-15 PROCEDURE — 96402 CHEMO HORMON ANTINEOPL SQ/IM: CPT

## 2022-02-15 RX ADMIN — FULVESTRANT 500 MG: 50 INJECTION INTRAMUSCULAR at 14:38

## 2022-03-15 ENCOUNTER — HOSPITAL ENCOUNTER (OUTPATIENT)
Dept: ONCOLOGY | Facility: HOSPITAL | Age: 72
Setting detail: INFUSION SERIES
Discharge: HOME | End: 2022-03-15

## 2022-03-15 ENCOUNTER — OFFICE VISIT (OUTPATIENT)
Dept: ONCOLOGY | Facility: HOSPITAL | Age: 72
End: 2022-03-15

## 2022-03-15 ENCOUNTER — LAB (OUTPATIENT)
Dept: ONCOLOGY | Facility: HOSPITAL | Age: 72
End: 2022-03-15

## 2022-03-15 VITALS
BODY MASS INDEX: 25.54 KG/M2 | TEMPERATURE: 98.5 F | OXYGEN SATURATION: 99 % | RESPIRATION RATE: 16 BRPM | WEIGHT: 126.54 LBS | DIASTOLIC BLOOD PRESSURE: 75 MMHG | HEART RATE: 68 BPM | SYSTOLIC BLOOD PRESSURE: 120 MMHG

## 2022-03-15 DIAGNOSIS — C50.912 MALIGNANT NEOPLASM OF LEFT FEMALE BREAST, UNSPECIFIED ESTROGEN RECEPTOR STATUS, UNSPECIFIED SITE OF BREAST: Primary | ICD-10-CM

## 2022-03-15 DIAGNOSIS — Z45.2 ADJUSTMENT AND MANAGEMENT OF VASCULAR ACCESS DEVICE: ICD-10-CM

## 2022-03-15 LAB
ALBUMIN SERPL-MCNC: 4.38 G/DL (ref 3.5–5.2)
ALBUMIN/GLOB SERPL: 1.7 G/DL
ALP SERPL-CCNC: 59 U/L (ref 39–117)
ALT SERPL W P-5'-P-CCNC: 12 U/L (ref 1–33)
ANION GAP SERPL CALCULATED.3IONS-SCNC: 5 MMOL/L (ref 5–15)
AST SERPL-CCNC: 17 U/L (ref 1–32)
BASOPHILS # BLD AUTO: 0.02 10*3/MM3 (ref 0–0.2)
BASOPHILS NFR BLD AUTO: 0.5 % (ref 0–1.5)
BILIRUB SERPL-MCNC: 0.3 MG/DL (ref 0–1.2)
BUN SERPL-MCNC: 8 MG/DL (ref 8–23)
BUN/CREAT SERPL: 11.8 (ref 7–25)
CALCIUM SPEC-SCNC: 9.3 MG/DL (ref 8.6–10.5)
CHLORIDE SERPL-SCNC: 103 MMOL/L (ref 98–107)
CO2 SERPL-SCNC: 30 MMOL/L (ref 22–29)
CREAT SERPL-MCNC: 0.68 MG/DL (ref 0.57–1)
DEPRECATED RDW RBC AUTO: 43.9 FL (ref 37–54)
EGFRCR SERPLBLD CKD-EPI 2021: 93.2 ML/MIN/1.73
EOSINOPHIL # BLD AUTO: 0.14 10*3/MM3 (ref 0–0.4)
EOSINOPHIL NFR BLD AUTO: 3.3 % (ref 0.3–6.2)
ERYTHROCYTE [DISTWIDTH] IN BLOOD BY AUTOMATED COUNT: 13 % (ref 12.3–15.4)
GLOBULIN UR ELPH-MCNC: 2.5 GM/DL
GLUCOSE SERPL-MCNC: 100 MG/DL (ref 65–99)
HCT VFR BLD AUTO: 37.7 % (ref 34–46.6)
HGB BLD-MCNC: 12.5 G/DL (ref 12–15.9)
IMM GRANULOCYTES # BLD AUTO: 0 10*3/MM3 (ref 0–0.05)
IMM GRANULOCYTES NFR BLD AUTO: 0 % (ref 0–0.5)
LYMPHOCYTES # BLD AUTO: 1.95 10*3/MM3 (ref 0.7–3.1)
LYMPHOCYTES NFR BLD AUTO: 45.8 % (ref 19.6–45.3)
MCH RBC QN AUTO: 30 PG (ref 26.6–33)
MCHC RBC AUTO-ENTMCNC: 33.2 G/DL (ref 31.5–35.7)
MCV RBC AUTO: 90.6 FL (ref 79–97)
MONOCYTES # BLD AUTO: 0.42 10*3/MM3 (ref 0.1–0.9)
MONOCYTES NFR BLD AUTO: 9.9 % (ref 5–12)
NEUTROPHILS NFR BLD AUTO: 1.73 10*3/MM3 (ref 1.7–7)
NEUTROPHILS NFR BLD AUTO: 40.5 % (ref 42.7–76)
PLATELET # BLD AUTO: 226 10*3/MM3 (ref 140–450)
PMV BLD AUTO: 9.3 FL (ref 6–12)
POTASSIUM SERPL-SCNC: 4 MMOL/L (ref 3.5–5.2)
PROT SERPL-MCNC: 6.9 G/DL (ref 6–8.5)
RBC # BLD AUTO: 4.16 10*6/MM3 (ref 3.77–5.28)
SODIUM SERPL-SCNC: 138 MMOL/L (ref 136–145)
WBC NRBC COR # BLD: 4.26 10*3/MM3 (ref 3.4–10.8)

## 2022-03-15 PROCEDURE — 25010000002 FULVESTRANT PER 25 MG: Performed by: NURSE PRACTITIONER

## 2022-03-15 PROCEDURE — 99214 OFFICE O/P EST MOD 30 MIN: CPT | Performed by: NURSE PRACTITIONER

## 2022-03-15 PROCEDURE — 85025 COMPLETE CBC W/AUTO DIFF WBC: CPT | Performed by: INTERNAL MEDICINE

## 2022-03-15 PROCEDURE — G0463 HOSPITAL OUTPT CLINIC VISIT: HCPCS | Performed by: NURSE PRACTITIONER

## 2022-03-15 PROCEDURE — 80053 COMPREHEN METABOLIC PANEL: CPT | Performed by: INTERNAL MEDICINE

## 2022-03-15 PROCEDURE — 36591 DRAW BLOOD OFF VENOUS DEVICE: CPT

## 2022-03-15 PROCEDURE — 96401 CHEMO ANTI-NEOPL SQ/IM: CPT

## 2022-03-15 PROCEDURE — 96402 CHEMO HORMON ANTINEOPL SQ/IM: CPT

## 2022-03-15 PROCEDURE — 25010000002 HEPARIN LOCK FLUSH PER 10 UNITS: Performed by: INTERNAL MEDICINE

## 2022-03-15 RX ORDER — HEPARIN SODIUM (PORCINE) LOCK FLUSH IV SOLN 100 UNIT/ML 100 UNIT/ML
500 SOLUTION INTRAVENOUS AS NEEDED
Status: CANCELLED | OUTPATIENT
Start: 2022-03-15

## 2022-03-15 RX ORDER — HEPARIN SODIUM (PORCINE) LOCK FLUSH IV SOLN 100 UNIT/ML 100 UNIT/ML
500 SOLUTION INTRAVENOUS AS NEEDED
Status: DISCONTINUED | OUTPATIENT
Start: 2022-03-15 | End: 2022-03-16 | Stop reason: HOSPADM

## 2022-03-15 RX ORDER — SODIUM CHLORIDE 0.9 % (FLUSH) 0.9 %
20 SYRINGE (ML) INJECTION AS NEEDED
Status: DISCONTINUED | OUTPATIENT
Start: 2022-03-15 | End: 2022-03-16 | Stop reason: HOSPADM

## 2022-03-15 RX ORDER — SODIUM CHLORIDE 0.9 % (FLUSH) 0.9 %
20 SYRINGE (ML) INJECTION AS NEEDED
Status: CANCELLED | OUTPATIENT
Start: 2022-03-15

## 2022-03-15 RX ADMIN — FULVESTRANT 500 MG: 250 INJECTION INTRAMUSCULAR at 14:06

## 2022-03-15 RX ADMIN — HEPARIN SODIUM (PORCINE) LOCK FLUSH IV SOLN 100 UNIT/ML 500 UNITS: 100 SOLUTION at 13:23

## 2022-03-15 RX ADMIN — Medication 20 ML: at 13:23

## 2022-03-15 NOTE — ADDENDUM NOTE
Encounter addended by: Paresh Edwards RN on: 3/15/2022 3:15 PM   Actions taken: Charge Capture section accepted

## 2022-03-15 NOTE — PROGRESS NOTES
Chief Complaint  Breast Cancer    Saba Juarez MD P* Johnson, TRACY Bosch      Subjective          Sweetie Huitron presents to [facility] HEMATOLOGY & ONCOLOGY for treatment visit for Faslodex.     History of Present Illness   Ms. Sweetie Huitron presents for monthly follow up for Faslodex injections for breast cancer. She received neoadjuvant chemotherapy with AC and XRT in 2018. Completed left mastectomy. Lab work reviewed: CBC is reviewed and is acceptable for treatment. Previous CMP on 2/15/2022 is normal. Previous imaging in [facility] of 1/19/2022 with bone scan and CT CAP were all benign. Next dose of Prolia injection is due in May of 2022. She offers no complaints today.     Cancer Staging  No matching staging information was found for the patient.     Treatment intent: curative    Oncology/Hematology History Overview Note     ER+ Left breast cancer:    - prior treatment by Dr. Guerrero and Dr. Bell     - diagnosed December 2017 presented with small pleural effusion and nonresectable breast cancer due to involvement of the whole breast, extension to the chest wall and anterior mediastinum with partial destruction of the anterior left second rib and the sternum, left axillary, intramammary and mediastinal adenopathy.            - neoadjuvant treatment with Adriamycin and Cytoxan and radiation therapy which finished on April 17, 2018.      - PET CT scan done on September 28, 2018 showed decreased size of the left breast with ill-defined trabecular thickening, skin thickening and decreased FDG activity with a maximum SUV of 3.2 consistent with treatment related change previously enlarged hypermetabolic bilateral axillary, mediastinal, intramammary lymphadenopathy is no longer   identified with no abnormal FDG activity seen consistent with treatment change.        - Underwent left mastectomy.        - Started on Faslodex February 22, 2018.  Patient had refused additional  treatment including Ibrance or Kisqali.            - Repeat CAT scan done in April 17, 2019 showed interval resolution of lingular nodule consistent with resolved focal pneumonia or likely postradiation changes left breast with linear scarring in the apex of the left lung.      - No active disease bilateral screening mammogram done on January 24, 2019 showed markedly decreased size of the left breast which may be post radiation therapy with skin thickening and benign rodlike calcifications.  Bilateral mammogram done in March 19, 2020 showed no mammographic signs of malignancy.        Osteoporosis:  - DEXA scan on 3/19/20: osteoporsis  - currently on Prolia    Alzheimer's Dementia:  - impaired congnition  - sister helps with health care appointments     Malignant neoplasm of left female breast (HCC)   12/8/2017 Initial Diagnosis    Malignant neoplasm of left female breast (CMS/HCC)     8/26/2021 -  Chemotherapy    OP SUPPORTIVE Fulvestrant     9/24/2021 -  Chemotherapy    OP CENTRAL VENOUS ACCESS DEVICE ACCESS, CARE, AND MAINTENANCE (CVAD)     Malignant neoplasm of unspecified site of left female breast (HCC)   10/25/2021 Initial Diagnosis    Malignant neoplasm of unspecified site of left female breast (HCC)     11/23/2021 -  Chemotherapy    OP SUPPORTIVE Denosumab (Prolia) Q6M         Review of Systems   Constitutional: Positive for fatigue. Negative for appetite change, diaphoresis, fever, unexpected weight gain and unexpected weight loss.   HENT: Negative for hearing loss, sore throat and voice change.    Eyes: Negative for blurred vision, double vision, pain, redness and visual disturbance.   Respiratory: Negative for cough, shortness of breath and wheezing.    Cardiovascular: Negative for chest pain, palpitations and leg swelling.   Endocrine: Negative for cold intolerance, heat intolerance, polydipsia and polyuria.   Genitourinary: Negative for decreased urine volume, difficulty urinating, frequency and urinary  incontinence.   Musculoskeletal: Negative for arthralgias, back pain, joint swelling and myalgias.   Skin: Negative for color change, rash, skin lesions and wound.   Neurological: Negative for dizziness, seizures, numbness and headache.   Hematological: Negative for adenopathy. Does not bruise/bleed easily.   Psychiatric/Behavioral: Negative for depressed mood. The patient is not nervous/anxious.    All other systems reviewed and are negative.      Current Outpatient Medications on File Prior to Visit   Medication Sig Dispense Refill   • calcium carbonate (OS-RAUL) 1250 (500 Ca) MG chewable tablet Chew 1 tablet.     • cetirizine (zyrTEC) 10 MG tablet Take 10 mg by mouth Daily.     • donepezil (ARICEPT) 10 MG tablet      • loratadine (CLARITIN) 10 MG tablet Take 10 mg by mouth Daily.     • QUEtiapine (SEROquel) 25 MG tablet      • QUEtiapine (SEROquel) 50 MG tablet        No current facility-administered medications on file prior to visit.       No Known Allergies  Past Medical History:   Diagnosis Date   • Breast cancer (HCC)    • Cancer (HCC)    • Memory change    • Memory loss      Past Surgical History:   Procedure Laterality Date   •  SECTION       Social History     Socioeconomic History   • Marital status:    Tobacco Use   • Smoking status: Never Smoker   • Smokeless tobacco: Never Used   Substance and Sexual Activity   • Alcohol use: Not Currently   • Drug use: Never   • Sexual activity: Defer     Family History   Problem Relation Age of Onset   • Arthritis Other    • Stroke Other    • Heart disease Other      Immunization History   Administered Date(s) Administered   • COVID-19 (MODERNA) 1st, 2nd, 3rd Dose Only 2021, 2021, 2021       Objective   Physical Exam  Vitals and nursing note reviewed.   Constitutional:       Appearance: She is normal weight.   HENT:      Head: Normocephalic.      Mouth/Throat:      Mouth: Mucous membranes are moist.   Eyes:      Conjunctiva/sclera:  Conjunctivae normal.   Cardiovascular:      Rate and Rhythm: Normal rate and regular rhythm.      Pulses: Normal pulses.      Heart sounds: Normal heart sounds.   Pulmonary:      Breath sounds: Normal breath sounds.   Abdominal:      General: Bowel sounds are normal.      Palpations: Abdomen is soft.   Musculoskeletal:         General: Normal range of motion.      Cervical back: Normal range of motion and neck supple.   Skin:     General: Skin is warm and dry.      Capillary Refill: Capillary refill takes less than 2 seconds.   Neurological:      General: No focal deficit present.      Mental Status: She is alert and oriented to person, place, and time.   Psychiatric:         Mood and Affect: Mood normal.         Behavior: Behavior normal.         Thought Content: Thought content normal.         Judgment: Judgment normal.         Vitals:    03/15/22 1246   BP: 120/75   Pulse: 68   Resp: 16   Temp: 98.5 °F (36.9 °C)   SpO2: 99%   Weight: 57.4 kg (126 lb 8.7 oz)   PainSc: 0-No pain     ECOG score: 0         ECOG: (0) Fully Active - Able to Carry On All Pre-disease Performance Without Restriction  Fall Risk Assessment was completed, and patient is at low risk for falls.  PHQ-9 Total Score: 0       The patient is  experiencing fatigue. Fatigue score: 2    PT/OT Functional Screening: PT fx screen: No needs identified  Speech Functional Screening: Speech fx screen: No needs identified  Rehab to be ordered: Rehab to be ordered: No needs identified        Result Review :   The following data was reviewed by: TRACY Trotter on 03/15/2022:  Lab Results   Component Value Date    HGB 12.5 03/15/2022    HCT 37.7 03/15/2022    MCV 90.6 03/15/2022     03/15/2022    WBC 4.26 03/15/2022    NEUTROABS 1.73 03/15/2022    LYMPHSABS 1.95 03/15/2022    MONOSABS 0.42 03/15/2022    EOSABS 0.14 03/15/2022    BASOSABS 0.02 03/15/2022     Lab Results   Component Value Date    GLUCOSE 101 (H) 02/15/2022    BUN 11 02/15/2022     CREATININE 0.75 02/15/2022     02/15/2022    K 4.0 02/15/2022     02/15/2022    CO2 27.0 02/15/2022    CALCIUM 9.5 02/15/2022    PROTEINTOT 7.2 02/15/2022    ALBUMIN 4.46 02/15/2022    BILITOT 0.3 02/15/2022    ALKPHOS 61 02/15/2022    AST 16 02/15/2022    ALT 11 02/15/2022          Assessment and Plan    Diagnoses and all orders for this visit:    1. Malignant neoplasm of left female breast, unspecified estrogen receptor status, unspecified site of breast (HCC) (Primary)      Labs reviewed. OK for Faslodex treatment today.   Return in 1 month.     Prolia due in May of 2022.   Follow up every other month with provider.       Patient Follow Up: 2 months.     Patient was given instructions and counseling regarding her condition or for health maintenance advice. Please see specific information pulled into the AVS if appropriate.     Nan Monroy, APRN    3/15/2022

## 2022-04-12 ENCOUNTER — HOSPITAL ENCOUNTER (OUTPATIENT)
Dept: ONCOLOGY | Facility: HOSPITAL | Age: 72
Setting detail: INFUSION SERIES
Discharge: HOME | End: 2022-04-12

## 2022-04-12 ENCOUNTER — LAB (OUTPATIENT)
Dept: ONCOLOGY | Facility: HOSPITAL | Age: 72
End: 2022-04-12

## 2022-04-12 VITALS
SYSTOLIC BLOOD PRESSURE: 144 MMHG | HEART RATE: 75 BPM | OXYGEN SATURATION: 100 % | DIASTOLIC BLOOD PRESSURE: 78 MMHG | TEMPERATURE: 97.8 F | RESPIRATION RATE: 17 BRPM

## 2022-04-12 DIAGNOSIS — C50.912 MALIGNANT NEOPLASM OF LEFT FEMALE BREAST, UNSPECIFIED ESTROGEN RECEPTOR STATUS, UNSPECIFIED SITE OF BREAST: Primary | ICD-10-CM

## 2022-04-12 LAB
ALBUMIN SERPL-MCNC: 4.38 G/DL (ref 3.5–5.2)
ALBUMIN/GLOB SERPL: 1.4 G/DL
ALP SERPL-CCNC: 70 U/L (ref 39–117)
ALT SERPL W P-5'-P-CCNC: 15 U/L (ref 1–33)
ANION GAP SERPL CALCULATED.3IONS-SCNC: 5.2 MMOL/L (ref 5–15)
AST SERPL-CCNC: 22 U/L (ref 1–32)
BASOPHILS # BLD AUTO: 0.03 10*3/MM3 (ref 0–0.2)
BASOPHILS NFR BLD AUTO: 0.8 % (ref 0–1.5)
BILIRUB SERPL-MCNC: 0.3 MG/DL (ref 0–1.2)
BUN SERPL-MCNC: 12 MG/DL (ref 8–23)
BUN/CREAT SERPL: 16.4 (ref 7–25)
CALCIUM SPEC-SCNC: 9.8 MG/DL (ref 8.6–10.5)
CHLORIDE SERPL-SCNC: 104 MMOL/L (ref 98–107)
CO2 SERPL-SCNC: 29.8 MMOL/L (ref 22–29)
CREAT SERPL-MCNC: 0.73 MG/DL (ref 0.57–1)
DEPRECATED RDW RBC AUTO: 44.1 FL (ref 37–54)
EGFRCR SERPLBLD CKD-EPI 2021: 88 ML/MIN/1.73
EOSINOPHIL # BLD AUTO: 0.16 10*3/MM3 (ref 0–0.4)
EOSINOPHIL NFR BLD AUTO: 4.1 % (ref 0.3–6.2)
ERYTHROCYTE [DISTWIDTH] IN BLOOD BY AUTOMATED COUNT: 12.9 % (ref 12.3–15.4)
GLOBULIN UR ELPH-MCNC: 3.1 GM/DL
GLUCOSE SERPL-MCNC: 98 MG/DL (ref 65–99)
HCT VFR BLD AUTO: 41.3 % (ref 34–46.6)
HGB BLD-MCNC: 13.4 G/DL (ref 12–15.9)
IMM GRANULOCYTES # BLD AUTO: 0 10*3/MM3 (ref 0–0.05)
IMM GRANULOCYTES NFR BLD AUTO: 0 % (ref 0–0.5)
LYMPHOCYTES # BLD AUTO: 1.54 10*3/MM3 (ref 0.7–3.1)
LYMPHOCYTES NFR BLD AUTO: 39.9 % (ref 19.6–45.3)
MCH RBC QN AUTO: 29.6 PG (ref 26.6–33)
MCHC RBC AUTO-ENTMCNC: 32.4 G/DL (ref 31.5–35.7)
MCV RBC AUTO: 91.4 FL (ref 79–97)
MONOCYTES # BLD AUTO: 0.34 10*3/MM3 (ref 0.1–0.9)
MONOCYTES NFR BLD AUTO: 8.8 % (ref 5–12)
NEUTROPHILS NFR BLD AUTO: 1.79 10*3/MM3 (ref 1.7–7)
NEUTROPHILS NFR BLD AUTO: 46.4 % (ref 42.7–76)
PLATELET # BLD AUTO: 269 10*3/MM3 (ref 140–450)
PMV BLD AUTO: 8.9 FL (ref 6–12)
POTASSIUM SERPL-SCNC: 3.7 MMOL/L (ref 3.5–5.2)
PROT SERPL-MCNC: 7.5 G/DL (ref 6–8.5)
RBC # BLD AUTO: 4.52 10*6/MM3 (ref 3.77–5.28)
SODIUM SERPL-SCNC: 139 MMOL/L (ref 136–145)
WBC NRBC COR # BLD: 3.86 10*3/MM3 (ref 3.4–10.8)

## 2022-04-12 PROCEDURE — 85025 COMPLETE CBC W/AUTO DIFF WBC: CPT

## 2022-04-12 PROCEDURE — 96402 CHEMO HORMON ANTINEOPL SQ/IM: CPT

## 2022-04-12 PROCEDURE — 25010000002 FULVESTRANT PER 25 MG: Performed by: NURSE PRACTITIONER

## 2022-04-12 PROCEDURE — 36415 COLL VENOUS BLD VENIPUNCTURE: CPT

## 2022-04-12 PROCEDURE — 80053 COMPREHEN METABOLIC PANEL: CPT

## 2022-04-12 RX ADMIN — FULVESTRANT 500 MG: 250 INJECTION INTRAMUSCULAR at 14:03

## 2022-05-10 ENCOUNTER — HOSPITAL ENCOUNTER (OUTPATIENT)
Dept: ONCOLOGY | Facility: HOSPITAL | Age: 72
Setting detail: INFUSION SERIES
Discharge: HOME | End: 2022-05-10

## 2022-05-10 ENCOUNTER — LAB (OUTPATIENT)
Dept: ONCOLOGY | Facility: HOSPITAL | Age: 72
End: 2022-05-10

## 2022-05-10 ENCOUNTER — APPOINTMENT (OUTPATIENT)
Dept: ONCOLOGY | Facility: HOSPITAL | Age: 72
End: 2022-05-10

## 2022-05-10 DIAGNOSIS — C50.912 MALIGNANT NEOPLASM OF LEFT FEMALE BREAST, UNSPECIFIED ESTROGEN RECEPTOR STATUS, UNSPECIFIED SITE OF BREAST: Primary | ICD-10-CM

## 2022-05-10 LAB
ALBUMIN SERPL-MCNC: 4.74 G/DL (ref 3.5–5.2)
ALBUMIN/GLOB SERPL: 1.4 G/DL
ALP SERPL-CCNC: 78 U/L (ref 39–117)
ALT SERPL W P-5'-P-CCNC: 13 U/L (ref 1–33)
ANION GAP SERPL CALCULATED.3IONS-SCNC: 11.4 MMOL/L (ref 5–15)
AST SERPL-CCNC: 20 U/L (ref 1–32)
BASOPHILS # BLD AUTO: 0.02 10*3/MM3 (ref 0–0.2)
BASOPHILS NFR BLD AUTO: 0.4 % (ref 0–1.5)
BILIRUB SERPL-MCNC: 0.5 MG/DL (ref 0–1.2)
BUN SERPL-MCNC: 7 MG/DL (ref 8–23)
BUN/CREAT SERPL: 9.2 (ref 7–25)
CALCIUM SPEC-SCNC: 9.7 MG/DL (ref 8.6–10.5)
CHLORIDE SERPL-SCNC: 101 MMOL/L (ref 98–107)
CO2 SERPL-SCNC: 25.6 MMOL/L (ref 22–29)
CREAT SERPL-MCNC: 0.76 MG/DL (ref 0.57–1)
DEPRECATED RDW RBC AUTO: 44.3 FL (ref 37–54)
EGFRCR SERPLBLD CKD-EPI 2021: 83.9 ML/MIN/1.73
EOSINOPHIL # BLD AUTO: 0.16 10*3/MM3 (ref 0–0.4)
EOSINOPHIL NFR BLD AUTO: 3.1 % (ref 0.3–6.2)
ERYTHROCYTE [DISTWIDTH] IN BLOOD BY AUTOMATED COUNT: 13.2 % (ref 12.3–15.4)
GLOBULIN UR ELPH-MCNC: 3.4 GM/DL
GLUCOSE SERPL-MCNC: 93 MG/DL (ref 65–99)
HCT VFR BLD AUTO: 45.3 % (ref 34–46.6)
HGB BLD-MCNC: 14.6 G/DL (ref 12–15.9)
IMM GRANULOCYTES # BLD AUTO: 0.01 10*3/MM3 (ref 0–0.05)
IMM GRANULOCYTES NFR BLD AUTO: 0.2 % (ref 0–0.5)
LYMPHOCYTES # BLD AUTO: 1.66 10*3/MM3 (ref 0.7–3.1)
LYMPHOCYTES NFR BLD AUTO: 31.7 % (ref 19.6–45.3)
MCH RBC QN AUTO: 29.1 PG (ref 26.6–33)
MCHC RBC AUTO-ENTMCNC: 32.2 G/DL (ref 31.5–35.7)
MCV RBC AUTO: 90.2 FL (ref 79–97)
MONOCYTES # BLD AUTO: 0.35 10*3/MM3 (ref 0.1–0.9)
MONOCYTES NFR BLD AUTO: 6.7 % (ref 5–12)
NEUTROPHILS NFR BLD AUTO: 3.04 10*3/MM3 (ref 1.7–7)
NEUTROPHILS NFR BLD AUTO: 57.9 % (ref 42.7–76)
PLATELET # BLD AUTO: 253 10*3/MM3 (ref 140–450)
PMV BLD AUTO: 9.1 FL (ref 6–12)
POTASSIUM SERPL-SCNC: 4 MMOL/L (ref 3.5–5.2)
PROT SERPL-MCNC: 8.1 G/DL (ref 6–8.5)
RBC # BLD AUTO: 5.02 10*6/MM3 (ref 3.77–5.28)
SODIUM SERPL-SCNC: 138 MMOL/L (ref 136–145)
WBC NRBC COR # BLD: 5.24 10*3/MM3 (ref 3.4–10.8)

## 2022-05-10 PROCEDURE — 96402 CHEMO HORMON ANTINEOPL SQ/IM: CPT

## 2022-05-10 PROCEDURE — 36415 COLL VENOUS BLD VENIPUNCTURE: CPT

## 2022-05-10 PROCEDURE — 85025 COMPLETE CBC W/AUTO DIFF WBC: CPT

## 2022-05-10 PROCEDURE — 25010000002 FULVESTRANT PER 25 MG: Performed by: INTERNAL MEDICINE

## 2022-05-10 PROCEDURE — 80053 COMPREHEN METABOLIC PANEL: CPT

## 2022-05-10 RX ADMIN — FULVESTRANT 500 MG: 50 INJECTION INTRAMUSCULAR at 11:56

## 2022-05-16 ENCOUNTER — HOSPITAL ENCOUNTER (OUTPATIENT)
Dept: ONCOLOGY | Facility: HOSPITAL | Age: 72
Setting detail: INFUSION SERIES
Discharge: HOME | End: 2022-05-16

## 2022-05-16 DIAGNOSIS — Z45.2 ADJUSTMENT AND MANAGEMENT OF VASCULAR ACCESS DEVICE: Primary | ICD-10-CM

## 2022-05-16 DIAGNOSIS — C50.912 MALIGNANT NEOPLASM OF LEFT FEMALE BREAST, UNSPECIFIED ESTROGEN RECEPTOR STATUS, UNSPECIFIED SITE OF BREAST: ICD-10-CM

## 2022-05-16 PROCEDURE — 96523 IRRIG DRUG DELIVERY DEVICE: CPT

## 2022-05-16 PROCEDURE — 25010000002 HEPARIN LOCK FLUSH PER 10 UNITS: Performed by: INTERNAL MEDICINE

## 2022-05-16 RX ORDER — HEPARIN SODIUM (PORCINE) LOCK FLUSH IV SOLN 100 UNIT/ML 100 UNIT/ML
500 SOLUTION INTRAVENOUS AS NEEDED
Status: DISCONTINUED | OUTPATIENT
Start: 2022-05-16 | End: 2022-05-17 | Stop reason: HOSPADM

## 2022-05-16 RX ORDER — SODIUM CHLORIDE 0.9 % (FLUSH) 0.9 %
20 SYRINGE (ML) INJECTION AS NEEDED
Status: CANCELLED | OUTPATIENT
Start: 2022-05-16

## 2022-05-16 RX ORDER — SODIUM CHLORIDE 0.9 % (FLUSH) 0.9 %
20 SYRINGE (ML) INJECTION AS NEEDED
Status: DISCONTINUED | OUTPATIENT
Start: 2022-05-16 | End: 2022-05-17 | Stop reason: HOSPADM

## 2022-05-16 RX ORDER — HEPARIN SODIUM (PORCINE) LOCK FLUSH IV SOLN 100 UNIT/ML 100 UNIT/ML
500 SOLUTION INTRAVENOUS AS NEEDED
Status: CANCELLED | OUTPATIENT
Start: 2022-05-16

## 2022-05-16 RX ADMIN — Medication 20 ML: at 10:33

## 2022-05-16 RX ADMIN — HEPARIN SODIUM (PORCINE) LOCK FLUSH IV SOLN 100 UNIT/ML 500 UNITS: 100 SOLUTION at 10:33

## 2022-05-18 RX ORDER — HEPARIN SODIUM (PORCINE) LOCK FLUSH IV SOLN 100 UNIT/ML 100 UNIT/ML
500 SOLUTION INTRAVENOUS AS NEEDED
Status: CANCELLED | OUTPATIENT
Start: 2022-05-18

## 2022-05-18 RX ORDER — HEPARIN SODIUM (PORCINE) LOCK FLUSH IV SOLN 100 UNIT/ML 100 UNIT/ML
500 SOLUTION INTRAVENOUS AS NEEDED
Status: ACTIVE | OUTPATIENT
Start: 2022-05-18

## 2022-05-18 RX ORDER — SODIUM CHLORIDE 0.9 % (FLUSH) 0.9 %
20 SYRINGE (ML) INJECTION AS NEEDED
Status: CANCELLED | OUTPATIENT
Start: 2022-05-18

## 2022-05-18 RX ORDER — SODIUM CHLORIDE 0.9 % (FLUSH) 0.9 %
20 SYRINGE (ML) INJECTION AS NEEDED
Status: ACTIVE | OUTPATIENT
Start: 2022-05-18

## 2022-06-07 ENCOUNTER — HOSPITAL ENCOUNTER (OUTPATIENT)
Dept: ONCOLOGY | Facility: HOSPITAL | Age: 72
Setting detail: INFUSION SERIES
Discharge: HOME | End: 2022-06-07

## 2022-06-07 ENCOUNTER — OFFICE VISIT (OUTPATIENT)
Dept: ONCOLOGY | Facility: HOSPITAL | Age: 72
End: 2022-06-07

## 2022-06-07 VITALS
OXYGEN SATURATION: 100 % | HEART RATE: 61 BPM | BODY MASS INDEX: 25.46 KG/M2 | WEIGHT: 126.1 LBS | SYSTOLIC BLOOD PRESSURE: 141 MMHG | DIASTOLIC BLOOD PRESSURE: 65 MMHG | TEMPERATURE: 97.6 F | RESPIRATION RATE: 18 BRPM

## 2022-06-07 DIAGNOSIS — M81.0 AGE-RELATED OSTEOPOROSIS WITHOUT CURRENT PATHOLOGICAL FRACTURE: ICD-10-CM

## 2022-06-07 DIAGNOSIS — C50.912 MALIGNANT NEOPLASM OF LEFT FEMALE BREAST, UNSPECIFIED ESTROGEN RECEPTOR STATUS, UNSPECIFIED SITE OF BREAST: ICD-10-CM

## 2022-06-07 DIAGNOSIS — Z45.2 ADJUSTMENT AND MANAGEMENT OF VASCULAR ACCESS DEVICE: ICD-10-CM

## 2022-06-07 DIAGNOSIS — C50.912 MALIGNANT NEOPLASM OF LEFT FEMALE BREAST, UNSPECIFIED ESTROGEN RECEPTOR STATUS, UNSPECIFIED SITE OF BREAST: Primary | ICD-10-CM

## 2022-06-07 LAB
ALBUMIN SERPL-MCNC: 4.46 G/DL (ref 3.5–5.2)
ALBUMIN/GLOB SERPL: 1.7 G/DL
ALP SERPL-CCNC: 64 U/L (ref 39–117)
ALT SERPL W P-5'-P-CCNC: 9 U/L (ref 1–33)
ANION GAP SERPL CALCULATED.3IONS-SCNC: 8.3 MMOL/L (ref 5–15)
AST SERPL-CCNC: 16 U/L (ref 1–32)
BASOPHILS # BLD AUTO: 0.02 10*3/MM3 (ref 0–0.2)
BASOPHILS NFR BLD AUTO: 0.4 % (ref 0–1.5)
BILIRUB SERPL-MCNC: 0.6 MG/DL (ref 0–1.2)
BUN SERPL-MCNC: 9 MG/DL (ref 8–23)
BUN/CREAT SERPL: 11.7 (ref 7–25)
CALCIUM SPEC-SCNC: 9.1 MG/DL (ref 8.6–10.5)
CHLORIDE SERPL-SCNC: 103 MMOL/L (ref 98–107)
CO2 SERPL-SCNC: 26.7 MMOL/L (ref 22–29)
CREAT SERPL-MCNC: 0.77 MG/DL (ref 0.57–1)
DEPRECATED RDW RBC AUTO: 43.8 FL (ref 37–54)
EGFRCR SERPLBLD CKD-EPI 2021: 82.6 ML/MIN/1.73
EOSINOPHIL # BLD AUTO: 0.14 10*3/MM3 (ref 0–0.4)
EOSINOPHIL NFR BLD AUTO: 3 % (ref 0.3–6.2)
ERYTHROCYTE [DISTWIDTH] IN BLOOD BY AUTOMATED COUNT: 13.1 % (ref 12.3–15.4)
GLOBULIN UR ELPH-MCNC: 2.6 GM/DL
GLUCOSE SERPL-MCNC: 164 MG/DL (ref 65–99)
HCT VFR BLD AUTO: 38.1 % (ref 34–46.6)
HGB BLD-MCNC: 12.7 G/DL (ref 12–15.9)
IMM GRANULOCYTES # BLD AUTO: 0 10*3/MM3 (ref 0–0.05)
IMM GRANULOCYTES NFR BLD AUTO: 0 % (ref 0–0.5)
LYMPHOCYTES # BLD AUTO: 1.72 10*3/MM3 (ref 0.7–3.1)
LYMPHOCYTES NFR BLD AUTO: 36.7 % (ref 19.6–45.3)
MAGNESIUM SERPL-MCNC: 2.3 MG/DL (ref 1.6–2.4)
MCH RBC QN AUTO: 29.8 PG (ref 26.6–33)
MCHC RBC AUTO-ENTMCNC: 33.3 G/DL (ref 31.5–35.7)
MCV RBC AUTO: 89.4 FL (ref 79–97)
MONOCYTES # BLD AUTO: 0.26 10*3/MM3 (ref 0.1–0.9)
MONOCYTES NFR BLD AUTO: 5.5 % (ref 5–12)
NEUTROPHILS NFR BLD AUTO: 2.55 10*3/MM3 (ref 1.7–7)
NEUTROPHILS NFR BLD AUTO: 54.4 % (ref 42.7–76)
PHOSPHATE SERPL-MCNC: 2.6 MG/DL (ref 2.5–4.5)
PLATELET # BLD AUTO: 232 10*3/MM3 (ref 140–450)
PMV BLD AUTO: 9.2 FL (ref 6–12)
POTASSIUM SERPL-SCNC: 3.5 MMOL/L (ref 3.5–5.2)
PROT SERPL-MCNC: 7.1 G/DL (ref 6–8.5)
RBC # BLD AUTO: 4.26 10*6/MM3 (ref 3.77–5.28)
SODIUM SERPL-SCNC: 138 MMOL/L (ref 136–145)
WBC NRBC COR # BLD: 4.69 10*3/MM3 (ref 3.4–10.8)

## 2022-06-07 PROCEDURE — 96372 THER/PROPH/DIAG INJ SC/IM: CPT

## 2022-06-07 PROCEDURE — 85025 COMPLETE CBC W/AUTO DIFF WBC: CPT | Performed by: INTERNAL MEDICINE

## 2022-06-07 PROCEDURE — 36591 DRAW BLOOD OFF VENOUS DEVICE: CPT

## 2022-06-07 PROCEDURE — 25010000002 HEPARIN LOCK FLUSH PER 10 UNITS: Performed by: INTERNAL MEDICINE

## 2022-06-07 PROCEDURE — 99214 OFFICE O/P EST MOD 30 MIN: CPT | Performed by: INTERNAL MEDICINE

## 2022-06-07 PROCEDURE — 80053 COMPREHEN METABOLIC PANEL: CPT | Performed by: INTERNAL MEDICINE

## 2022-06-07 PROCEDURE — 84100 ASSAY OF PHOSPHORUS: CPT | Performed by: INTERNAL MEDICINE

## 2022-06-07 PROCEDURE — 25010000002 DENOSUMAB 60 MG/ML SOLUTION PREFILLED SYRINGE: Performed by: INTERNAL MEDICINE

## 2022-06-07 PROCEDURE — 25010000002 FULVESTRANT PER 25 MG: Performed by: INTERNAL MEDICINE

## 2022-06-07 PROCEDURE — 83735 ASSAY OF MAGNESIUM: CPT | Performed by: INTERNAL MEDICINE

## 2022-06-07 PROCEDURE — 96402 CHEMO HORMON ANTINEOPL SQ/IM: CPT

## 2022-06-07 RX ORDER — HEPARIN SODIUM (PORCINE) LOCK FLUSH IV SOLN 100 UNIT/ML 100 UNIT/ML
500 SOLUTION INTRAVENOUS AS NEEDED
Status: DISCONTINUED | OUTPATIENT
Start: 2022-06-07 | End: 2022-06-08 | Stop reason: HOSPADM

## 2022-06-07 RX ORDER — SODIUM CHLORIDE 0.9 % (FLUSH) 0.9 %
20 SYRINGE (ML) INJECTION AS NEEDED
Status: DISCONTINUED | OUTPATIENT
Start: 2022-06-07 | End: 2022-06-08 | Stop reason: HOSPADM

## 2022-06-07 RX ORDER — HEPARIN SODIUM (PORCINE) LOCK FLUSH IV SOLN 100 UNIT/ML 100 UNIT/ML
500 SOLUTION INTRAVENOUS AS NEEDED
Status: CANCELLED | OUTPATIENT
Start: 2022-06-07

## 2022-06-07 RX ORDER — NITROFURANTOIN 25; 75 MG/1; MG/1
CAPSULE ORAL
COMMUNITY
Start: 2022-06-01 | End: 2022-11-29

## 2022-06-07 RX ORDER — SODIUM CHLORIDE 0.9 % (FLUSH) 0.9 %
20 SYRINGE (ML) INJECTION AS NEEDED
Status: CANCELLED | OUTPATIENT
Start: 2022-06-07

## 2022-06-07 RX ORDER — SIMVASTATIN 20 MG
20 TABLET ORAL NIGHTLY
COMMUNITY
Start: 2022-05-24

## 2022-06-07 RX ADMIN — DENOSUMAB 60 MG: 60 INJECTION SUBCUTANEOUS at 14:21

## 2022-06-07 RX ADMIN — FULVESTRANT 500 MG: 250 INJECTION INTRAMUSCULAR at 14:23

## 2022-06-07 RX ADMIN — HEPARIN SODIUM (PORCINE) LOCK FLUSH IV SOLN 100 UNIT/ML 500 UNITS: 100 SOLUTION at 13:07

## 2022-06-07 RX ADMIN — Medication 20 ML: at 13:06

## 2022-06-07 NOTE — PROGRESS NOTES
Patient  Sweetie Huitron    Location  [facility] HEMATOLOGY & ONCOLOGY    Chief Complaint  Breast Cancer    Referring Provider: TRACY Woo  PCP: Do Garvin APRN    Subjective          Oncology/Hematology History Overview Note     ER+ Left breast cancer:    - prior treatment by Dr. Guerrero and Dr. Bell     - diagnosed December 2017 presented with small pleural effusion and nonresectable breast cancer due to involvement of the whole breast, extension to the chest wall and anterior mediastinum with partial destruction of the anterior left second rib and the sternum, left axillary, intramammary and mediastinal adenopathy.            - neoadjuvant treatment with Adriamycin and Cytoxan and radiation therapy which finished on April 17, 2018.      - PET CT scan done on September 28, 2018 showed decreased size of the left breast with ill-defined trabecular thickening, skin thickening and decreased FDG activity with a maximum SUV of 3.2 consistent with treatment related change previously enlarged hypermetabolic bilateral axillary, mediastinal, intramammary lymphadenopathy is no longer   identified with no abnormal FDG activity seen consistent with treatment change.        - Underwent left mastectomy.        - Started on Faslodex February 22, 2018.  Patient had refused additional treatment including Ibrance or Kisqali.            - Repeat CAT scan done in April 17, 2019 showed interval resolution of lingular nodule consistent with resolved focal pneumonia or likely postradiation changes left breast with linear scarring in the apex of the left lung.      - No active disease bilateral screening mammogram done on January 24, 2019 showed markedly decreased size of the left breast which may be post radiation therapy with skin thickening and benign rodlike calcifications.  Bilateral mammogram done in March 19, 2020 showed no mammographic signs of malignancy.        Osteoporosis:  - DEXA  scan on 3/19/20: osteoporsis  - currently on Prolia    Alzheimer's Dementia:  - impaired congnition  - sister helps with health care appointments     Malignant neoplasm of left female breast (HCC)   12/8/2017 Initial Diagnosis    Malignant neoplasm of left female breast (CMS/HCC)     8/26/2021 -  Chemotherapy    OP SUPPORTIVE Fulvestrant     9/24/2021 -  Chemotherapy    OP CENTRAL VENOUS ACCESS DEVICE ACCESS, CARE, AND MAINTENANCE (CVAD)     Malignant neoplasm of unspecified site of left female breast (HCC)   10/25/2021 Initial Diagnosis    Malignant neoplasm of unspecified site of left female breast (HCC)     11/23/2021 -  Chemotherapy    OP SUPPORTIVE Denosumab (Prolia) Q6M         HPI  Patient comes in today by herself for her next dose of fulvestrant.  She also get Prolia today.  She has no new complaints or concerns.     Review of Systems   Constitutional: Positive for fatigue. Negative for appetite change, diaphoresis, fever, unexpected weight gain and unexpected weight loss.   HENT: Negative for hearing loss, sore throat and voice change.    Eyes: Negative for blurred vision, double vision, pain, redness and visual disturbance.   Respiratory: Negative for cough, shortness of breath and wheezing.    Cardiovascular: Negative for chest pain, palpitations and leg swelling.   Endocrine: Negative for cold intolerance, heat intolerance, polydipsia and polyuria.   Genitourinary: Negative for decreased urine volume, difficulty urinating, frequency and urinary incontinence.   Musculoskeletal: Negative for arthralgias, back pain, joint swelling and myalgias.   Skin: Negative for color change, rash, skin lesions and wound.   Neurological: Negative for dizziness, seizures, numbness and headache.   Hematological: Negative for adenopathy. Does not bruise/bleed easily.   Psychiatric/Behavioral: Negative for depressed mood. The patient is not nervous/anxious.    All other systems reviewed and are negative.      Past Medical  History:   Diagnosis Date   • Breast cancer (HCC)    • Cancer (HCC)    • Memory change    • Memory loss      Past Surgical History:   Procedure Laterality Date   •  SECTION       Social History     Socioeconomic History   • Marital status:    Tobacco Use   • Smoking status: Never Smoker   • Smokeless tobacco: Never Used   Substance and Sexual Activity   • Alcohol use: Not Currently   • Drug use: Never   • Sexual activity: Defer     Family History   Problem Relation Age of Onset   • Arthritis Other    • Stroke Other    • Heart disease Other        Objective   Physical Exam  General: Alert, cooperative, no acute distress, well appearing and pleasant  Eyes: Anicteric sclera, PERRLA  Respiratory: normal respiratory effort  Cardiovascular: no lower extremity edema  Skin: Normal tone, no rash, no lesions  Psychiatric: Appropriate affect, memory not assessed  Neurologic: No focal sensory or motor deficits, normal cognition   Musculoskeletal: Normal muscle strength and tone  Extremities: No clubbing, cyanosis, or deformities    Vitals:    22 1315   BP: 141/65   Pulse: 61   Resp: 18   Temp: 97.6 °F (36.4 °C)   SpO2: 100%   Weight: 57.2 kg (126 lb 1.7 oz)   PainSc: 0-No pain     ECOG score: 0         PHQ-9 Total Score:         Result Review :   The following data was reviewed by: Saba Juarez MD PhD on 2022:  Lab Results   Component Value Date    HGB 14.6 05/10/2022    HCT 45.3 05/10/2022    MCV 90.2 05/10/2022     05/10/2022    WBC 5.24 05/10/2022    NEUTROABS 3.04 05/10/2022    LYMPHSABS 1.66 05/10/2022    MONOSABS 0.35 05/10/2022    EOSABS 0.16 05/10/2022    BASOSABS 0.02 05/10/2022     Lab Results   Component Value Date    GLUCOSE 93 05/10/2022    BUN 7 (L) 05/10/2022    CREATININE 0.76 05/10/2022     05/10/2022    K 4.0 05/10/2022     05/10/2022    CO2 25.6 05/10/2022    CALCIUM 9.7 05/10/2022    PROTEINTOT 8.1 05/10/2022    ALBUMIN 4.74 05/10/2022    BILITOT 0.5  05/10/2022    ALKPHOS 78 05/10/2022    AST 20 05/10/2022    ALT 13 05/10/2022          Assessment and Plan    Diagnoses and all orders for this visit:    1. Malignant neoplasm of left female breast, unspecified estrogen receptor status, unspecified site of breast (HCC)  -     CT chest w contrast; Future  -     CT abdomen pelvis w contrast; Future  -     NM Bone Scan Whole Body; Future      Metastatic ER+ left breast cancer:  The patient is tolerating Faslodex and Prolia well.  Recent CBC and CMP are essentially normal.  She will continue treatment as planned. I will plan for restaging CT CAP with contrast and bone scan and f/u with her in 1 month.      Patient was given instructions and counseling regarding her condition or for health maintenance advice. Please see specific information pulled into the AVS if appropriate.

## 2022-06-16 ENCOUNTER — APPOINTMENT (OUTPATIENT)
Dept: ONCOLOGY | Facility: HOSPITAL | Age: 72
End: 2022-06-16

## 2022-06-30 ENCOUNTER — TELEPHONE (OUTPATIENT)
Dept: ONCOLOGY | Facility: HOSPITAL | Age: 72
End: 2022-06-30

## 2022-06-30 NOTE — TELEPHONE ENCOUNTER
Called pt to let her know of new appt day/time. Appt was moves due to scans, f/u needs to be after. New appt is 7-11 at 8:15 labs Dr then shot

## 2022-07-05 ENCOUNTER — APPOINTMENT (OUTPATIENT)
Dept: ONCOLOGY | Facility: HOSPITAL | Age: 72
End: 2022-07-05

## 2022-07-07 ENCOUNTER — HOSPITAL ENCOUNTER (OUTPATIENT)
Dept: NUCLEAR MEDICINE | Facility: HOSPITAL | Age: 72
Discharge: HOME | End: 2022-07-07

## 2022-07-07 ENCOUNTER — HOSPITAL ENCOUNTER (OUTPATIENT)
Dept: CT IMAGING | Facility: HOSPITAL | Age: 72
Discharge: HOME | End: 2022-07-07

## 2022-07-07 DIAGNOSIS — C50.912 MALIGNANT NEOPLASM OF LEFT FEMALE BREAST, UNSPECIFIED ESTROGEN RECEPTOR STATUS, UNSPECIFIED SITE OF BREAST: ICD-10-CM

## 2022-07-07 PROCEDURE — 0 TECHNETIUM MEDRONATE KIT: Performed by: INTERNAL MEDICINE

## 2022-07-07 PROCEDURE — 78306 BONE IMAGING WHOLE BODY: CPT

## 2022-07-07 PROCEDURE — A9503 TC99M MEDRONATE: HCPCS | Performed by: INTERNAL MEDICINE

## 2022-07-07 PROCEDURE — 0 IOPAMIDOL PER 1 ML: Performed by: INTERNAL MEDICINE

## 2022-07-07 PROCEDURE — 74177 CT ABD & PELVIS W/CONTRAST: CPT

## 2022-07-07 PROCEDURE — 71260 CT THORAX DX C+: CPT

## 2022-07-07 RX ORDER — TC 99M MEDRONATE 20 MG/10ML
20.6 INJECTION, POWDER, LYOPHILIZED, FOR SOLUTION INTRAVENOUS
Status: COMPLETED | OUTPATIENT
Start: 2022-07-07 | End: 2022-07-07

## 2022-07-07 RX ADMIN — TC 99M MEDRONATE 20.6 MILLICURIE: 20 INJECTION, POWDER, LYOPHILIZED, FOR SOLUTION INTRAVENOUS at 13:35

## 2022-07-07 RX ADMIN — IOPAMIDOL 100 ML: 755 INJECTION, SOLUTION INTRAVENOUS at 14:56

## 2022-07-11 ENCOUNTER — HOSPITAL ENCOUNTER (OUTPATIENT)
Dept: ONCOLOGY | Facility: HOSPITAL | Age: 72
Setting detail: INFUSION SERIES
Discharge: HOME | End: 2022-07-11

## 2022-07-11 ENCOUNTER — OFFICE VISIT (OUTPATIENT)
Dept: ONCOLOGY | Facility: HOSPITAL | Age: 72
End: 2022-07-11

## 2022-07-11 VITALS
HEART RATE: 62 BPM | WEIGHT: 125.22 LBS | SYSTOLIC BLOOD PRESSURE: 142 MMHG | OXYGEN SATURATION: 100 % | DIASTOLIC BLOOD PRESSURE: 70 MMHG | RESPIRATION RATE: 16 BRPM | BODY MASS INDEX: 25.28 KG/M2 | TEMPERATURE: 97.8 F

## 2022-07-11 DIAGNOSIS — C50.912 MALIGNANT NEOPLASM OF LEFT FEMALE BREAST, UNSPECIFIED ESTROGEN RECEPTOR STATUS, UNSPECIFIED SITE OF BREAST: Primary | ICD-10-CM

## 2022-07-11 DIAGNOSIS — G30.9 ALZHEIMER'S DISEASE: ICD-10-CM

## 2022-07-11 DIAGNOSIS — F02.80 ALZHEIMER'S DISEASE: ICD-10-CM

## 2022-07-11 DIAGNOSIS — Z45.2 ADJUSTMENT AND MANAGEMENT OF VASCULAR ACCESS DEVICE: ICD-10-CM

## 2022-07-11 LAB
ALBUMIN SERPL-MCNC: 4.8 G/DL (ref 3.5–5.2)
ALBUMIN/GLOB SERPL: 1.5 G/DL
ALP SERPL-CCNC: 65 U/L (ref 39–117)
ALT SERPL W P-5'-P-CCNC: 9 U/L (ref 1–33)
ANION GAP SERPL CALCULATED.3IONS-SCNC: 6.8 MMOL/L (ref 5–15)
AST SERPL-CCNC: 15 U/L (ref 1–32)
BASOPHILS # BLD AUTO: 0.02 10*3/MM3 (ref 0–0.2)
BASOPHILS NFR BLD AUTO: 0.4 % (ref 0–1.5)
BILIRUB SERPL-MCNC: 0.7 MG/DL (ref 0–1.2)
BUN SERPL-MCNC: 10 MG/DL (ref 8–23)
BUN/CREAT SERPL: 12.7 (ref 7–25)
CALCIUM SPEC-SCNC: 10 MG/DL (ref 8.6–10.5)
CHLORIDE SERPL-SCNC: 103 MMOL/L (ref 98–107)
CO2 SERPL-SCNC: 29.2 MMOL/L (ref 22–29)
CREAT SERPL-MCNC: 0.79 MG/DL (ref 0.57–1)
DEPRECATED RDW RBC AUTO: 43.3 FL (ref 37–54)
EGFRCR SERPLBLD CKD-EPI 2021: 80.1 ML/MIN/1.73
EOSINOPHIL # BLD AUTO: 0.14 10*3/MM3 (ref 0–0.4)
EOSINOPHIL NFR BLD AUTO: 2.7 % (ref 0.3–6.2)
ERYTHROCYTE [DISTWIDTH] IN BLOOD BY AUTOMATED COUNT: 13.2 % (ref 12.3–15.4)
GLOBULIN UR ELPH-MCNC: 3.1 GM/DL
GLUCOSE SERPL-MCNC: 101 MG/DL (ref 65–99)
HCT VFR BLD AUTO: 39.9 % (ref 34–46.6)
HGB BLD-MCNC: 13.3 G/DL (ref 12–15.9)
IMM GRANULOCYTES # BLD AUTO: 0.01 10*3/MM3 (ref 0–0.05)
IMM GRANULOCYTES NFR BLD AUTO: 0.2 % (ref 0–0.5)
LYMPHOCYTES # BLD AUTO: 2.46 10*3/MM3 (ref 0.7–3.1)
LYMPHOCYTES NFR BLD AUTO: 47.6 % (ref 19.6–45.3)
MCH RBC QN AUTO: 30 PG (ref 26.6–33)
MCHC RBC AUTO-ENTMCNC: 33.3 G/DL (ref 31.5–35.7)
MCV RBC AUTO: 89.9 FL (ref 79–97)
MONOCYTES # BLD AUTO: 0.34 10*3/MM3 (ref 0.1–0.9)
MONOCYTES NFR BLD AUTO: 6.6 % (ref 5–12)
NEUTROPHILS NFR BLD AUTO: 2.2 10*3/MM3 (ref 1.7–7)
NEUTROPHILS NFR BLD AUTO: 42.5 % (ref 42.7–76)
NRBC BLD AUTO-RTO: 0 /100 WBC (ref 0–0.2)
PLATELET # BLD AUTO: 235 10*3/MM3 (ref 140–450)
PMV BLD AUTO: 9.8 FL (ref 6–12)
POTASSIUM SERPL-SCNC: 3.8 MMOL/L (ref 3.5–5.2)
PROT SERPL-MCNC: 7.9 G/DL (ref 6–8.5)
RBC # BLD AUTO: 4.44 10*6/MM3 (ref 3.77–5.28)
SODIUM SERPL-SCNC: 139 MMOL/L (ref 136–145)
WBC NRBC COR # BLD: 5.17 10*3/MM3 (ref 3.4–10.8)

## 2022-07-11 PROCEDURE — 36591 DRAW BLOOD OFF VENOUS DEVICE: CPT

## 2022-07-11 PROCEDURE — 80053 COMPREHEN METABOLIC PANEL: CPT | Performed by: INTERNAL MEDICINE

## 2022-07-11 PROCEDURE — 25010000002 HEPARIN LOCK FLUSH PER 10 UNITS: Performed by: INTERNAL MEDICINE

## 2022-07-11 PROCEDURE — G0463 HOSPITAL OUTPT CLINIC VISIT: HCPCS | Performed by: INTERNAL MEDICINE

## 2022-07-11 PROCEDURE — 85025 COMPLETE CBC W/AUTO DIFF WBC: CPT | Performed by: INTERNAL MEDICINE

## 2022-07-11 PROCEDURE — 25010000002 FULVESTRANT PER 25 MG: Performed by: INTERNAL MEDICINE

## 2022-07-11 PROCEDURE — 99214 OFFICE O/P EST MOD 30 MIN: CPT | Performed by: INTERNAL MEDICINE

## 2022-07-11 PROCEDURE — 96402 CHEMO HORMON ANTINEOPL SQ/IM: CPT

## 2022-07-11 RX ORDER — HEPARIN SODIUM (PORCINE) LOCK FLUSH IV SOLN 100 UNIT/ML 100 UNIT/ML
500 SOLUTION INTRAVENOUS AS NEEDED
Status: DISCONTINUED | OUTPATIENT
Start: 2022-07-11 | End: 2022-07-12 | Stop reason: HOSPADM

## 2022-07-11 RX ORDER — SODIUM CHLORIDE 0.9 % (FLUSH) 0.9 %
20 SYRINGE (ML) INJECTION AS NEEDED
Status: CANCELLED | OUTPATIENT
Start: 2022-07-11

## 2022-07-11 RX ORDER — HEPARIN SODIUM (PORCINE) LOCK FLUSH IV SOLN 100 UNIT/ML 100 UNIT/ML
500 SOLUTION INTRAVENOUS AS NEEDED
Status: CANCELLED | OUTPATIENT
Start: 2022-07-11

## 2022-07-11 RX ORDER — SODIUM CHLORIDE 0.9 % (FLUSH) 0.9 %
20 SYRINGE (ML) INJECTION AS NEEDED
Status: DISCONTINUED | OUTPATIENT
Start: 2022-07-11 | End: 2022-07-12 | Stop reason: HOSPADM

## 2022-07-11 RX ADMIN — Medication 20 ML: at 08:21

## 2022-07-11 RX ADMIN — FULVESTRANT 500 MG: 50 INJECTION INTRAMUSCULAR at 10:15

## 2022-07-11 RX ADMIN — HEPARIN SODIUM (PORCINE) LOCK FLUSH IV SOLN 100 UNIT/ML 500 UNITS: 100 SOLUTION at 08:22

## 2022-07-11 NOTE — PROGRESS NOTES
Patient  Sweetie Huitron    Location  [facility] HEMATOLOGY & ONCOLOGY    Chief Complaint  Breast Cancer    Referring Provider: TRACY Woo  PCP: Do Garvin APRN    Subjective          Oncology/Hematology History Overview Note     ER+ Left breast cancer:    - prior treatment by Dr. Guerrero and Dr. Bell     - diagnosed December 2017 presented with small pleural effusion and nonresectable breast cancer due to involvement of the whole breast, extension to the chest wall and anterior mediastinum with partial destruction of the anterior left second rib and the sternum, left axillary, intramammary and mediastinal adenopathy.            - neoadjuvant treatment with Adriamycin and Cytoxan and radiation therapy which finished on April 17, 2018.      - PET CT scan done on September 28, 2018 showed decreased size of the left breast with ill-defined trabecular thickening, skin thickening and decreased FDG activity with a maximum SUV of 3.2 consistent with treatment related change previously enlarged hypermetabolic bilateral axillary, mediastinal, intramammary lymphadenopathy is no longer   identified with no abnormal FDG activity seen consistent with treatment change.        - Underwent left mastectomy.        - Started on Faslodex February 22, 2018.  Patient had refused additional treatment including Ibrance or Kisqali.            - Repeat CAT scan done in April 17, 2019 showed interval resolution of lingular nodule consistent with resolved focal pneumonia or likely postradiation changes left breast with linear scarring in the apex of the left lung.      - No active disease bilateral screening mammogram done on January 24, 2019 showed markedly decreased size of the left breast which may be post radiation therapy with skin thickening and benign rodlike calcifications.  Bilateral mammogram done in March 19, 2020 showed no mammographic signs of malignancy.        Osteoporosis:  - DEXA  scan on 3/19/20: osteoporsis  - currently on Prolia    Alzheimer's Dementia:  - impaired congnition  - sister helps with health care appointments     Malignant neoplasm of left female breast (HCC)   2017 Initial Diagnosis    Malignant neoplasm of left female breast (CMS/HCC)     2021 -  Chemotherapy    OP SUPPORTIVE Fulvestrant     2021 -  Chemotherapy    OP CENTRAL VENOUS ACCESS DEVICE ACCESS, CARE, AND MAINTENANCE (CVAD)     Malignant neoplasm of unspecified site of left female breast (HCC)   10/25/2021 Initial Diagnosis    Malignant neoplasm of unspecified site of left female breast (HCC)     2021 -  Chemotherapy    OP SUPPORTIVE Denosumab (Prolia) Q6M         HPI  Patient comes in today for toxicity check prior to her next cycle of Faslodex.  She also had a recent CT CAP with contrast and bone scan for restaging.  Fortunately these were negative for any evidence of disease.  Patient does not report any concerning symptoms.  However, she does have dementia and was alone for the visit today.    Review of Systems   Constitutional: Positive for fatigue.   All other systems reviewed and are negative.      Past Medical History:   Diagnosis Date   • Breast cancer (HCC)    • Cancer (HCC)    • Memory change    • Memory loss      Past Surgical History:   Procedure Laterality Date   •  SECTION       Social History     Socioeconomic History   • Marital status:    Tobacco Use   • Smoking status: Never Smoker   • Smokeless tobacco: Never Used   Substance and Sexual Activity   • Alcohol use: Not Currently   • Drug use: Never   • Sexual activity: Defer     Family History   Problem Relation Age of Onset   • Arthritis Other    • Stroke Other    • Heart disease Other        Objective   Physical Exam  General: Alert, cooperative, no acute distress  Eyes: Anicteric sclera, PERRLA  Respiratory: normal respiratory effort  Cardiovascular: no lower extremity edema  Skin: Normal tone, no rash, no  lesions  Psychiatric: Appropriate affect, intact judgment  Neurologic: No focal sensory or motor deficits, moderate dementia, unable to recall historical events  Musculoskeletal: Normal muscle strength and tone  Extremities: No clubbing, cyanosis, or deformities    Vitals:    07/11/22 0914   BP: 142/70   Pulse: 62   Resp: 16   Temp: 97.8 °F (36.6 °C)   SpO2: 100%   Weight: 56.8 kg (125 lb 3.5 oz)   PainSc: 0-No pain     ECOG score: 0         PHQ-9 Total Score:         Result Review :   The following data was reviewed by: Saba Juarez MD PhD on 07/11/2022:  Lab Results   Component Value Date    HGB 13.3 07/11/2022    HCT 39.9 07/11/2022    MCV 89.9 07/11/2022     07/11/2022    WBC 5.17 07/11/2022    NEUTROABS 2.20 07/11/2022    LYMPHSABS 2.46 07/11/2022    MONOSABS 0.34 07/11/2022    EOSABS 0.14 07/11/2022    BASOSABS 0.02 07/11/2022     Lab Results   Component Value Date    GLUCOSE 101 (H) 07/11/2022    BUN 10 07/11/2022    CREATININE 0.79 07/11/2022     07/11/2022    K 3.8 07/11/2022     07/11/2022    CO2 29.2 (H) 07/11/2022    CALCIUM 10.0 07/11/2022    PROTEINTOT 7.9 07/11/2022    ALBUMIN 4.80 07/11/2022    BILITOT 0.7 07/11/2022    ALKPHOS 65 07/11/2022    AST 15 07/11/2022    ALT 9 07/11/2022          Assessment and Plan    Diagnoses and all orders for this visit:    1. Malignant neoplasm of left female breast, unspecified estrogen receptor status, unspecified site of breast (HCC) (Primary)    2. Alzheimer's disease (HCC)        Metastatic ER+ left breast cancer:  The patient is tolerating Faslodex and Prolia well.  Recent CBC and CMP are essentially normal.  CT CAP with contrast and bone scan are negative for active disease.  She will continue treatment as planned.  I will continue to follow-up with her for repeat toxicity check in 3 months.  I will plan for the next scans in 6 months.    Alzheimer's dementia: Patient is able to communicate but has poor memory and relies on others for  care.  She has a legal guardian for decision-making.    Patient was given instructions and counseling regarding her condition or for health maintenance advice. Please see specific information pulled into the AVS if appropriate.

## 2022-07-19 ENCOUNTER — TRANSCRIBE ORDERS (OUTPATIENT)
Dept: ADMINISTRATIVE | Facility: HOSPITAL | Age: 72
End: 2022-07-19

## 2022-07-19 DIAGNOSIS — M81.0 OSTEOPOROSIS, POST-MENOPAUSAL: Primary | ICD-10-CM

## 2022-08-08 ENCOUNTER — HOSPITAL ENCOUNTER (OUTPATIENT)
Dept: ONCOLOGY | Facility: HOSPITAL | Age: 72
Setting detail: INFUSION SERIES
Discharge: HOME | End: 2022-08-08

## 2022-08-08 VITALS
DIASTOLIC BLOOD PRESSURE: 67 MMHG | TEMPERATURE: 97.6 F | RESPIRATION RATE: 15 BRPM | SYSTOLIC BLOOD PRESSURE: 133 MMHG | OXYGEN SATURATION: 100 % | HEART RATE: 62 BPM

## 2022-08-08 DIAGNOSIS — C50.912 MALIGNANT NEOPLASM OF LEFT FEMALE BREAST, UNSPECIFIED ESTROGEN RECEPTOR STATUS, UNSPECIFIED SITE OF BREAST: Primary | ICD-10-CM

## 2022-08-08 DIAGNOSIS — Z45.2 ADJUSTMENT AND MANAGEMENT OF VASCULAR ACCESS DEVICE: ICD-10-CM

## 2022-08-08 DIAGNOSIS — M81.0 AGE-RELATED OSTEOPOROSIS WITHOUT CURRENT PATHOLOGICAL FRACTURE: ICD-10-CM

## 2022-08-08 LAB
ALBUMIN SERPL-MCNC: 4.2 G/DL (ref 3.5–5.2)
ALBUMIN/GLOB SERPL: 1.6 G/DL
ALP SERPL-CCNC: 61 U/L (ref 39–117)
ALT SERPL W P-5'-P-CCNC: 8 U/L (ref 1–33)
ANION GAP SERPL CALCULATED.3IONS-SCNC: 12.3 MMOL/L (ref 5–15)
AST SERPL-CCNC: 14 U/L (ref 1–32)
BASOPHILS # BLD AUTO: 0.03 10*3/MM3 (ref 0–0.2)
BASOPHILS NFR BLD AUTO: 0.7 % (ref 0–1.5)
BILIRUB SERPL-MCNC: 0.5 MG/DL (ref 0–1.2)
BUN SERPL-MCNC: 12 MG/DL (ref 8–23)
BUN/CREAT SERPL: 15 (ref 7–25)
CALCIUM SPEC-SCNC: 8.6 MG/DL (ref 8.6–10.5)
CHLORIDE SERPL-SCNC: 105 MMOL/L (ref 98–107)
CO2 SERPL-SCNC: 22.7 MMOL/L (ref 22–29)
CREAT SERPL-MCNC: 0.8 MG/DL (ref 0.57–1)
DEPRECATED RDW RBC AUTO: 44.7 FL (ref 37–54)
EGFRCR SERPLBLD CKD-EPI 2021: 78.9 ML/MIN/1.73
EOSINOPHIL # BLD AUTO: 0.17 10*3/MM3 (ref 0–0.4)
EOSINOPHIL NFR BLD AUTO: 4.2 % (ref 0.3–6.2)
ERYTHROCYTE [DISTWIDTH] IN BLOOD BY AUTOMATED COUNT: 13.3 % (ref 12.3–15.4)
GLOBULIN UR ELPH-MCNC: 2.7 GM/DL
GLUCOSE SERPL-MCNC: 109 MG/DL (ref 65–99)
HCT VFR BLD AUTO: 38.3 % (ref 34–46.6)
HGB BLD-MCNC: 12.4 G/DL (ref 12–15.9)
IMM GRANULOCYTES # BLD AUTO: 0 10*3/MM3 (ref 0–0.05)
IMM GRANULOCYTES NFR BLD AUTO: 0 % (ref 0–0.5)
LYMPHOCYTES # BLD AUTO: 1.74 10*3/MM3 (ref 0.7–3.1)
LYMPHOCYTES NFR BLD AUTO: 43.1 % (ref 19.6–45.3)
MCH RBC QN AUTO: 29.3 PG (ref 26.6–33)
MCHC RBC AUTO-ENTMCNC: 32.4 G/DL (ref 31.5–35.7)
MCV RBC AUTO: 90.5 FL (ref 79–97)
MONOCYTES # BLD AUTO: 0.3 10*3/MM3 (ref 0.1–0.9)
MONOCYTES NFR BLD AUTO: 7.4 % (ref 5–12)
NEUTROPHILS NFR BLD AUTO: 1.8 10*3/MM3 (ref 1.7–7)
NEUTROPHILS NFR BLD AUTO: 44.6 % (ref 42.7–76)
PLATELET # BLD AUTO: 214 10*3/MM3 (ref 140–450)
PMV BLD AUTO: 9.1 FL (ref 6–12)
POTASSIUM SERPL-SCNC: 3.5 MMOL/L (ref 3.5–5.2)
PROT SERPL-MCNC: 6.9 G/DL (ref 6–8.5)
RBC # BLD AUTO: 4.23 10*6/MM3 (ref 3.77–5.28)
SODIUM SERPL-SCNC: 140 MMOL/L (ref 136–145)
WBC NRBC COR # BLD: 4.04 10*3/MM3 (ref 3.4–10.8)

## 2022-08-08 PROCEDURE — 25010000002 HEPARIN LOCK FLUSH PER 10 UNITS: Performed by: INTERNAL MEDICINE

## 2022-08-08 PROCEDURE — 36591 DRAW BLOOD OFF VENOUS DEVICE: CPT

## 2022-08-08 PROCEDURE — 96402 CHEMO HORMON ANTINEOPL SQ/IM: CPT

## 2022-08-08 PROCEDURE — 80053 COMPREHEN METABOLIC PANEL: CPT | Performed by: INTERNAL MEDICINE

## 2022-08-08 PROCEDURE — 25010000002 FULVESTRANT PER 25 MG: Performed by: INTERNAL MEDICINE

## 2022-08-08 PROCEDURE — 85025 COMPLETE CBC W/AUTO DIFF WBC: CPT | Performed by: INTERNAL MEDICINE

## 2022-08-08 RX ORDER — SODIUM CHLORIDE 0.9 % (FLUSH) 0.9 %
20 SYRINGE (ML) INJECTION AS NEEDED
Status: DISCONTINUED | OUTPATIENT
Start: 2022-08-08 | End: 2022-08-09 | Stop reason: HOSPADM

## 2022-08-08 RX ORDER — HEPARIN SODIUM (PORCINE) LOCK FLUSH IV SOLN 100 UNIT/ML 100 UNIT/ML
500 SOLUTION INTRAVENOUS AS NEEDED
Status: DISCONTINUED | OUTPATIENT
Start: 2022-08-08 | End: 2022-08-09 | Stop reason: HOSPADM

## 2022-08-08 RX ORDER — HEPARIN SODIUM (PORCINE) LOCK FLUSH IV SOLN 100 UNIT/ML 100 UNIT/ML
500 SOLUTION INTRAVENOUS AS NEEDED
Status: CANCELLED | OUTPATIENT
Start: 2022-08-08

## 2022-08-08 RX ORDER — SODIUM CHLORIDE 0.9 % (FLUSH) 0.9 %
20 SYRINGE (ML) INJECTION AS NEEDED
Status: CANCELLED | OUTPATIENT
Start: 2022-08-08

## 2022-08-08 RX ADMIN — Medication 20 ML: at 09:36

## 2022-08-08 RX ADMIN — HEPARIN SODIUM (PORCINE) LOCK FLUSH IV SOLN 100 UNIT/ML 500 UNITS: 100 SOLUTION at 09:36

## 2022-08-08 RX ADMIN — FULVESTRANT 500 MG: 50 INJECTION INTRAMUSCULAR at 10:40

## 2022-08-29 ENCOUNTER — OFFICE VISIT (OUTPATIENT)
Dept: NEUROLOGY | Facility: CLINIC | Age: 72
End: 2022-08-29

## 2022-08-29 VITALS
HEIGHT: 59 IN | WEIGHT: 126 LBS | SYSTOLIC BLOOD PRESSURE: 135 MMHG | HEART RATE: 75 BPM | DIASTOLIC BLOOD PRESSURE: 81 MMHG | BODY MASS INDEX: 25.4 KG/M2

## 2022-08-29 DIAGNOSIS — F02.80 LATE ONSET ALZHEIMER'S DEMENTIA WITHOUT BEHAVIORAL DISTURBANCE: Primary | ICD-10-CM

## 2022-08-29 DIAGNOSIS — G30.1 LATE ONSET ALZHEIMER'S DEMENTIA WITHOUT BEHAVIORAL DISTURBANCE: Primary | ICD-10-CM

## 2022-08-29 PROCEDURE — 99213 OFFICE O/P EST LOW 20 MIN: CPT | Performed by: PSYCHIATRY & NEUROLOGY

## 2022-08-29 RX ORDER — MEMANTINE HYDROCHLORIDE 10 MG/1
TABLET ORAL
Qty: 60 TABLET | Refills: 6 | Status: SHIPPED | OUTPATIENT
Start: 2022-08-29

## 2022-08-29 RX ORDER — MEMANTINE HYDROCHLORIDE 5 MG/1
TABLET ORAL
Qty: 28 TABLET | Refills: 0 | Status: SHIPPED | OUTPATIENT
Start: 2022-08-29 | End: 2022-10-11 | Stop reason: SDUPTHER

## 2022-08-29 NOTE — ASSESSMENT & PLAN NOTE
I will start her memantine at 5 mg daily for 1 week, twice a day the second week, 5 mg in the morning and 10 mg in the evening the third week and then 10 mg twice a day thereafter.  I discussed with her sister regarding instructions as well as adverse effects..  I will see her again in 3 months time for follow-up.

## 2022-08-29 NOTE — PROGRESS NOTES
"Chief Complaint  Dementia    Subjective          Sweetie Huitron is a 72 y.o. female who presents to [facility] NEUROLOGY & NEUROSURGERY  History of Present Illness  72-year-old woman with Alzheimer's disease here for follow-up.  She is here today with her sister.  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  She requires assistance for daily living.  Sometimes she is incontinent of stool and urine.  She needs to be reminded to bathe.  She does not have any behavioral changes of aggressiveness.  She does not sleep at night.  She was given Seroquel and it is not helping her.  Her sister tells me that the dosage could be increased by her PCP.     Objective   Vital Signs:   /81   Pulse 75   Ht 149.9 cm (59\")   Wt 57.2 kg (126 lb)   BMI 25.45 kg/m²     Physical Exam   She is alert, fluent, follows commands.  She did not talk much during this visit.  Heart is regular rhythm normal in rate.        Assessment and Plan  Diagnoses and all orders for this visit:    1. Late onset Alzheimer's dementia without behavioral disturbance (HCC) (Primary)  Assessment & Plan:  I will start her memantine at 5 mg daily for 1 week, twice a day the second week, 5 mg in the morning and 10 mg in the evening the third week and then 10 mg twice a day thereafter.  I discussed with her sister regarding instructions as well as adverse effects..  I will see her again in 3 months time for follow-up.      Other orders  -     memantine (NAMENDA) 5 MG tablet; 5 mg  Q AM 1st week, 5 mg BID 2nd week, 5 mg Q AM and 10 mg Q PM 3rd week  Dispense: 28 tablet; Refill: 0  -     memantine (NAMENDA) 10 MG tablet; Take 1 tablet twice a day starting in 4 weeks  Dispense: 60 tablet; Refill: 6       Total time spent with the patient and coordinating patient care was 25 minutes.    Follow Up  No follow-ups on file.  Patient was given instructions and " counseling regarding her condition or for health maintenance advice. Please see specific information pulled into the AVS if appropriate.

## 2022-09-12 ENCOUNTER — HOSPITAL ENCOUNTER (OUTPATIENT)
Dept: ONCOLOGY | Facility: HOSPITAL | Age: 72
Setting detail: INFUSION SERIES
Discharge: HOME | End: 2022-09-12

## 2022-09-12 VITALS
HEART RATE: 74 BPM | RESPIRATION RATE: 16 BRPM | SYSTOLIC BLOOD PRESSURE: 128 MMHG | OXYGEN SATURATION: 98 % | TEMPERATURE: 97.2 F | DIASTOLIC BLOOD PRESSURE: 69 MMHG

## 2022-09-12 DIAGNOSIS — C50.912 MALIGNANT NEOPLASM OF LEFT FEMALE BREAST, UNSPECIFIED ESTROGEN RECEPTOR STATUS, UNSPECIFIED SITE OF BREAST: Primary | ICD-10-CM

## 2022-09-12 DIAGNOSIS — Z45.2 ADJUSTMENT AND MANAGEMENT OF VASCULAR ACCESS DEVICE: ICD-10-CM

## 2022-09-12 LAB
ALBUMIN SERPL-MCNC: 4.5 G/DL (ref 3.5–5.2)
ALBUMIN/GLOB SERPL: 1.6 G/DL
ALP SERPL-CCNC: 66 U/L (ref 39–117)
ALT SERPL W P-5'-P-CCNC: 9 U/L (ref 1–33)
ANION GAP SERPL CALCULATED.3IONS-SCNC: 10.4 MMOL/L (ref 5–15)
AST SERPL-CCNC: 15 U/L (ref 1–32)
BASOPHILS # BLD AUTO: 0.03 10*3/MM3 (ref 0–0.2)
BASOPHILS NFR BLD AUTO: 0.8 % (ref 0–1.5)
BILIRUB SERPL-MCNC: 0.5 MG/DL (ref 0–1.2)
BUN SERPL-MCNC: 13 MG/DL (ref 8–23)
BUN/CREAT SERPL: 16 (ref 7–25)
CALCIUM SPEC-SCNC: 8.9 MG/DL (ref 8.6–10.5)
CHLORIDE SERPL-SCNC: 103 MMOL/L (ref 98–107)
CO2 SERPL-SCNC: 26.6 MMOL/L (ref 22–29)
CREAT SERPL-MCNC: 0.81 MG/DL (ref 0.57–1)
DEPRECATED RDW RBC AUTO: 44.7 FL (ref 37–54)
EGFRCR SERPLBLD CKD-EPI 2021: 77.7 ML/MIN/1.73
EOSINOPHIL # BLD AUTO: 0.17 10*3/MM3 (ref 0–0.4)
EOSINOPHIL NFR BLD AUTO: 4.3 % (ref 0.3–6.2)
ERYTHROCYTE [DISTWIDTH] IN BLOOD BY AUTOMATED COUNT: 13.3 % (ref 12.3–15.4)
GLOBULIN UR ELPH-MCNC: 2.8 GM/DL
GLUCOSE SERPL-MCNC: 157 MG/DL (ref 65–99)
HCT VFR BLD AUTO: 39.3 % (ref 34–46.6)
HGB BLD-MCNC: 12.8 G/DL (ref 12–15.9)
IMM GRANULOCYTES # BLD AUTO: 0.01 10*3/MM3 (ref 0–0.05)
IMM GRANULOCYTES NFR BLD AUTO: 0.3 % (ref 0–0.5)
LYMPHOCYTES # BLD AUTO: 1.76 10*3/MM3 (ref 0.7–3.1)
LYMPHOCYTES NFR BLD AUTO: 44.2 % (ref 19.6–45.3)
MCH RBC QN AUTO: 29.8 PG (ref 26.6–33)
MCHC RBC AUTO-ENTMCNC: 32.6 G/DL (ref 31.5–35.7)
MCV RBC AUTO: 91.4 FL (ref 79–97)
MONOCYTES # BLD AUTO: 0.22 10*3/MM3 (ref 0.1–0.9)
MONOCYTES NFR BLD AUTO: 5.5 % (ref 5–12)
NEUTROPHILS NFR BLD AUTO: 1.79 10*3/MM3 (ref 1.7–7)
NEUTROPHILS NFR BLD AUTO: 44.9 % (ref 42.7–76)
NRBC BLD AUTO-RTO: 0 /100 WBC (ref 0–0.2)
PLATELET # BLD AUTO: 212 10*3/MM3 (ref 140–450)
PMV BLD AUTO: 9.7 FL (ref 6–12)
POTASSIUM SERPL-SCNC: 3.6 MMOL/L (ref 3.5–5.2)
PROT SERPL-MCNC: 7.3 G/DL (ref 6–8.5)
RBC # BLD AUTO: 4.3 10*6/MM3 (ref 3.77–5.28)
SODIUM SERPL-SCNC: 140 MMOL/L (ref 136–145)
WBC NRBC COR # BLD: 3.98 10*3/MM3 (ref 3.4–10.8)

## 2022-09-12 PROCEDURE — 96402 CHEMO HORMON ANTINEOPL SQ/IM: CPT

## 2022-09-12 PROCEDURE — 25010000002 HEPARIN LOCK FLUSH PER 10 UNITS: Performed by: INTERNAL MEDICINE

## 2022-09-12 PROCEDURE — 36591 DRAW BLOOD OFF VENOUS DEVICE: CPT

## 2022-09-12 PROCEDURE — 25010000002 FULVESTRANT PER 25 MG: Performed by: INTERNAL MEDICINE

## 2022-09-12 PROCEDURE — 85025 COMPLETE CBC W/AUTO DIFF WBC: CPT | Performed by: INTERNAL MEDICINE

## 2022-09-12 PROCEDURE — 80053 COMPREHEN METABOLIC PANEL: CPT | Performed by: INTERNAL MEDICINE

## 2022-09-12 RX ORDER — SODIUM CHLORIDE 0.9 % (FLUSH) 0.9 %
20 SYRINGE (ML) INJECTION AS NEEDED
Status: DISCONTINUED | OUTPATIENT
Start: 2022-09-12 | End: 2022-09-13 | Stop reason: HOSPADM

## 2022-09-12 RX ORDER — HEPARIN SODIUM (PORCINE) LOCK FLUSH IV SOLN 100 UNIT/ML 100 UNIT/ML
500 SOLUTION INTRAVENOUS AS NEEDED
Status: DISCONTINUED | OUTPATIENT
Start: 2022-09-12 | End: 2022-09-13 | Stop reason: HOSPADM

## 2022-09-12 RX ORDER — SODIUM CHLORIDE 0.9 % (FLUSH) 0.9 %
20 SYRINGE (ML) INJECTION AS NEEDED
Status: CANCELLED | OUTPATIENT
Start: 2022-09-12

## 2022-09-12 RX ORDER — HEPARIN SODIUM (PORCINE) LOCK FLUSH IV SOLN 100 UNIT/ML 100 UNIT/ML
500 SOLUTION INTRAVENOUS AS NEEDED
Status: CANCELLED | OUTPATIENT
Start: 2022-09-12

## 2022-09-12 RX ADMIN — Medication 20 ML: at 09:09

## 2022-09-12 RX ADMIN — FULVESTRANT 500 MG: 250 INJECTION INTRAMUSCULAR at 10:40

## 2022-09-12 RX ADMIN — HEPARIN SODIUM (PORCINE) LOCK FLUSH IV SOLN 100 UNIT/ML 500 UNITS: 100 SOLUTION at 09:09

## 2022-10-10 RX ORDER — SODIUM CHLORIDE 0.9 % (FLUSH) 0.9 %
20 SYRINGE (ML) INJECTION AS NEEDED
Status: CANCELLED | OUTPATIENT
Start: 2022-10-10

## 2022-10-10 RX ORDER — HEPARIN SODIUM (PORCINE) LOCK FLUSH IV SOLN 100 UNIT/ML 100 UNIT/ML
500 SOLUTION INTRAVENOUS AS NEEDED
Status: CANCELLED | OUTPATIENT
Start: 2022-10-10

## 2022-10-11 ENCOUNTER — TELEPHONE (OUTPATIENT)
Dept: ONCOLOGY | Facility: HOSPITAL | Age: 72
End: 2022-10-11

## 2022-10-11 ENCOUNTER — HOSPITAL ENCOUNTER (OUTPATIENT)
Dept: ONCOLOGY | Facility: HOSPITAL | Age: 72
Setting detail: INFUSION SERIES
Discharge: HOME | End: 2022-10-11

## 2022-10-11 ENCOUNTER — OFFICE VISIT (OUTPATIENT)
Dept: ONCOLOGY | Facility: HOSPITAL | Age: 72
End: 2022-10-11

## 2022-10-11 ENCOUNTER — HOSPITAL ENCOUNTER (OUTPATIENT)
Dept: ONCOLOGY | Facility: HOSPITAL | Age: 72
Setting detail: INFUSION SERIES
End: 2022-10-11

## 2022-10-11 VITALS
HEIGHT: 59 IN | OXYGEN SATURATION: 98 % | DIASTOLIC BLOOD PRESSURE: 56 MMHG | SYSTOLIC BLOOD PRESSURE: 139 MMHG | RESPIRATION RATE: 16 BRPM | TEMPERATURE: 97.8 F | WEIGHT: 131.61 LBS | BODY MASS INDEX: 26.53 KG/M2 | HEART RATE: 76 BPM

## 2022-10-11 DIAGNOSIS — C79.51 METASTASIS TO BONE: ICD-10-CM

## 2022-10-11 DIAGNOSIS — F02.80 ALZHEIMER'S DEMENTIA WITHOUT BEHAVIORAL DISTURBANCE: ICD-10-CM

## 2022-10-11 DIAGNOSIS — C50.912 MALIGNANT NEOPLASM OF LEFT FEMALE BREAST, UNSPECIFIED ESTROGEN RECEPTOR STATUS, UNSPECIFIED SITE OF BREAST: Primary | ICD-10-CM

## 2022-10-11 DIAGNOSIS — C50.912 MALIGNANT NEOPLASM OF LEFT FEMALE BREAST, UNSPECIFIED ESTROGEN RECEPTOR STATUS, UNSPECIFIED SITE OF BREAST: ICD-10-CM

## 2022-10-11 DIAGNOSIS — G30.9 ALZHEIMER'S DEMENTIA WITHOUT BEHAVIORAL DISTURBANCE: ICD-10-CM

## 2022-10-11 DIAGNOSIS — Z45.2 ADJUSTMENT AND MANAGEMENT OF VASCULAR ACCESS DEVICE: Primary | ICD-10-CM

## 2022-10-11 LAB
ALBUMIN SERPL-MCNC: 4.47 G/DL (ref 3.5–5.2)
ALBUMIN/GLOB SERPL: 1.7 G/DL
ALP SERPL-CCNC: 71 U/L (ref 39–117)
ALT SERPL W P-5'-P-CCNC: 10 U/L (ref 1–33)
ANION GAP SERPL CALCULATED.3IONS-SCNC: 9 MMOL/L (ref 5–15)
AST SERPL-CCNC: 13 U/L (ref 1–32)
BASOPHILS # BLD AUTO: 0.02 10*3/MM3 (ref 0–0.2)
BASOPHILS NFR BLD AUTO: 0.4 % (ref 0–1.5)
BILIRUB SERPL-MCNC: 0.5 MG/DL (ref 0–1.2)
BUN SERPL-MCNC: 11 MG/DL (ref 8–23)
BUN/CREAT SERPL: 12.1 (ref 7–25)
CALCIUM SPEC-SCNC: 9.1 MG/DL (ref 8.6–10.5)
CHLORIDE SERPL-SCNC: 102 MMOL/L (ref 98–107)
CO2 SERPL-SCNC: 28 MMOL/L (ref 22–29)
CREAT SERPL-MCNC: 0.91 MG/DL (ref 0.57–1)
DEPRECATED RDW RBC AUTO: 46.6 FL (ref 37–54)
EGFRCR SERPLBLD CKD-EPI 2021: 67.6 ML/MIN/1.73
EOSINOPHIL # BLD AUTO: 0.14 10*3/MM3 (ref 0–0.4)
EOSINOPHIL NFR BLD AUTO: 2.9 % (ref 0.3–6.2)
ERYTHROCYTE [DISTWIDTH] IN BLOOD BY AUTOMATED COUNT: 13.5 % (ref 12.3–15.4)
GLOBULIN UR ELPH-MCNC: 2.6 GM/DL
GLUCOSE SERPL-MCNC: 179 MG/DL (ref 65–99)
HCT VFR BLD AUTO: 38.6 % (ref 34–46.6)
HGB BLD-MCNC: 12.5 G/DL (ref 12–15.9)
IMM GRANULOCYTES # BLD AUTO: 0 10*3/MM3 (ref 0–0.05)
IMM GRANULOCYTES NFR BLD AUTO: 0 % (ref 0–0.5)
LYMPHOCYTES # BLD AUTO: 1.9 10*3/MM3 (ref 0.7–3.1)
LYMPHOCYTES NFR BLD AUTO: 39.4 % (ref 19.6–45.3)
MCH RBC QN AUTO: 29.9 PG (ref 26.6–33)
MCHC RBC AUTO-ENTMCNC: 32.4 G/DL (ref 31.5–35.7)
MCV RBC AUTO: 92.3 FL (ref 79–97)
MONOCYTES # BLD AUTO: 0.25 10*3/MM3 (ref 0.1–0.9)
MONOCYTES NFR BLD AUTO: 5.2 % (ref 5–12)
NEUTROPHILS NFR BLD AUTO: 2.51 10*3/MM3 (ref 1.7–7)
NEUTROPHILS NFR BLD AUTO: 52.1 % (ref 42.7–76)
PLATELET # BLD AUTO: 214 10*3/MM3 (ref 140–450)
PMV BLD AUTO: 9.1 FL (ref 6–12)
POTASSIUM SERPL-SCNC: 4.1 MMOL/L (ref 3.5–5.2)
PROT SERPL-MCNC: 7.1 G/DL (ref 6–8.5)
RBC # BLD AUTO: 4.18 10*6/MM3 (ref 3.77–5.28)
SODIUM SERPL-SCNC: 139 MMOL/L (ref 136–145)
WBC NRBC COR # BLD: 4.82 10*3/MM3 (ref 3.4–10.8)

## 2022-10-11 PROCEDURE — G0463 HOSPITAL OUTPT CLINIC VISIT: HCPCS | Performed by: INTERNAL MEDICINE

## 2022-10-11 PROCEDURE — 36591 DRAW BLOOD OFF VENOUS DEVICE: CPT

## 2022-10-11 PROCEDURE — 99215 OFFICE O/P EST HI 40 MIN: CPT | Performed by: INTERNAL MEDICINE

## 2022-10-11 PROCEDURE — 25010000002 HEPARIN LOCK FLUSH PER 10 UNITS: Performed by: INTERNAL MEDICINE

## 2022-10-11 PROCEDURE — 85025 COMPLETE CBC W/AUTO DIFF WBC: CPT | Performed by: INTERNAL MEDICINE

## 2022-10-11 PROCEDURE — 80053 COMPREHEN METABOLIC PANEL: CPT | Performed by: INTERNAL MEDICINE

## 2022-10-11 RX ORDER — SODIUM CHLORIDE 0.9 % (FLUSH) 0.9 %
20 SYRINGE (ML) INJECTION AS NEEDED
OUTPATIENT
Start: 2022-10-11

## 2022-10-11 RX ORDER — HEPARIN SODIUM (PORCINE) LOCK FLUSH IV SOLN 100 UNIT/ML 100 UNIT/ML
500 SOLUTION INTRAVENOUS AS NEEDED
Status: DISCONTINUED | OUTPATIENT
Start: 2022-10-11 | End: 2022-10-12 | Stop reason: HOSPADM

## 2022-10-11 RX ORDER — HEPARIN SODIUM (PORCINE) LOCK FLUSH IV SOLN 100 UNIT/ML 100 UNIT/ML
500 SOLUTION INTRAVENOUS AS NEEDED
OUTPATIENT
Start: 2022-10-11

## 2022-10-11 RX ORDER — SODIUM CHLORIDE 0.9 % (FLUSH) 0.9 %
20 SYRINGE (ML) INJECTION AS NEEDED
Status: DISCONTINUED | OUTPATIENT
Start: 2022-10-11 | End: 2022-10-12 | Stop reason: HOSPADM

## 2022-10-11 RX ADMIN — Medication 20 ML: at 13:00

## 2022-10-11 RX ADMIN — HEPARIN 500 UNITS: 100 SYRINGE at 13:00

## 2022-10-11 NOTE — PROGRESS NOTES
Patient  Sweetie Huitron    Location  [facility] HEMATOLOGY & ONCOLOGY    Chief Complaint  Breast Cancer    Referring Provider: TRACY Woo  PCP: Do Garvin APRN    Subjective          Oncology/Hematology History Overview Note     ER+ Left breast cancer:    - prior treatment by Dr. Guerrero and Dr. Bell     - diagnosed December 2017 presented with small pleural effusion and nonresectable breast cancer due to involvement of the whole breast, extension to the chest wall and anterior mediastinum with partial destruction of the anterior left second rib and the sternum, left axillary, intramammary and mediastinal adenopathy.            - neoadjuvant treatment with Adriamycin and Cytoxan and radiation therapy which finished on April 17, 2018.      - PET CT scan done on September 28, 2018 showed decreased size of the left breast with ill-defined trabecular thickening, skin thickening and decreased FDG activity with a maximum SUV of 3.2 consistent with treatment related change previously enlarged hypermetabolic bilateral axillary, mediastinal, intramammary lymphadenopathy is no longer   identified with no abnormal FDG activity seen consistent with treatment change.        - Underwent left mastectomy.        - Started on Faslodex February 22, 2018.  Patient had refused additional treatment including Ibrance or Kisqali.            - Repeat CAT scan done in April 17, 2019 showed interval resolution of lingular nodule consistent with resolved focal pneumonia or likely postradiation changes left breast with linear scarring in the apex of the left lung.      - No active disease bilateral screening mammogram done on January 24, 2019 showed markedly decreased size of the left breast which may be post radiation therapy with skin thickening and benign rodlike calcifications.  Bilateral mammogram done in March 19, 2020 showed no mammographic signs of malignancy.        Osteoporosis:  - DEXA  scan on 3/19/20: osteoporsis  - currently on Prolia    Alzheimer's Dementia:  - impaired congnition  - sister helps with health care appointments     Malignant neoplasm of left female breast (HCC)   2017 Initial Diagnosis    Malignant neoplasm of left female breast (CMS/HCC)     2021 -  Chemotherapy    OP SUPPORTIVE Fulvestrant     2021 -  Chemotherapy    OP CENTRAL VENOUS ACCESS DEVICE ACCESS, CARE, AND MAINTENANCE (CVAD)     Malignant neoplasm of unspecified site of left female breast (HCC)   10/25/2021 Initial Diagnosis    Malignant neoplasm of unspecified site of left female breast (HCC)     2021 -  Chemotherapy    OP SUPPORTIVE Denosumab (Prolia) Q6M         HPI  Patient comes in today with her sister who is her caretaker and POA.  The patient's sister tells me that she is declining every day and her mental capacity.  The family is really struggling with care issues including bathing, eating, and general safety.  She asks about the importance of keeping these appointments.  I explained that her treatment is palliative in nature.  She also does not have an extensive disease burden so I believe it would be reasonable to stop treatment and future scans.  We also discussed possibility of palliative care or even hospice.  Hospice may be appropriate from an Alzheimer's and cognitive decline standpoint.  She would not meet criteria for hospice based on her history of malignancy.     Review of Systems   Unable to assess due to dementia    Past Medical History:   Diagnosis Date   • Breast cancer (HCC)    • Cancer (HCC)    • Memory change    • Memory loss      Past Surgical History:   Procedure Laterality Date   •  SECTION       Social History     Socioeconomic History   • Marital status:    Tobacco Use   • Smoking status: Never   • Smokeless tobacco: Never   Substance and Sexual Activity   • Alcohol use: Not Currently   • Drug use: Never   • Sexual activity: Defer     Family History  "  Problem Relation Age of Onset   • Arthritis Other    • Stroke Other    • Heart disease Other        Objective   Physical Exam  General: Alert, cooperative, no acute distress, well-appearing  Eyes: Anicteric sclera, PERRLA  Respiratory: normal respiratory effort  Cardiovascular: no lower extremity edema  Skin: Normal tone, no rash, no lesions  Psychiatric: Appropriate affect, pleasant but unable to assess fully  Neurologic: No focal sensory or motor deficits, unable to assess cognition since the patient spoke few words.  She can answer basic questions  Musculoskeletal: Normal muscle strength and tone  Extremities: No clubbing, cyanosis, or deformities    Vitals:    10/11/22 1308   BP: 139/56   Pulse: 76   Resp: 16   Temp: 97.8 °F (36.6 °C)   SpO2: 98%   Weight: 59.7 kg (131 lb 9.8 oz)   Height: 149.9 cm (59.02\")   PainSc: 0-No pain     ECOG score: 0         PHQ-9 Total Score:         Result Review :   The following data was reviewed by: Saba Juarez MD PhD on 10/11/2022:  Lab Results   Component Value Date    HGB 12.5 10/11/2022    HCT 38.6 10/11/2022    MCV 92.3 10/11/2022     10/11/2022    WBC 4.82 10/11/2022    NEUTROABS 2.51 10/11/2022    LYMPHSABS 1.90 10/11/2022    MONOSABS 0.25 10/11/2022    EOSABS 0.14 10/11/2022    BASOSABS 0.02 10/11/2022     Lab Results   Component Value Date    GLUCOSE 179 (H) 10/11/2022    BUN 11 10/11/2022    CREATININE 0.91 10/11/2022     10/11/2022    K 4.1 10/11/2022     10/11/2022    CO2 28.0 10/11/2022    CALCIUM 9.1 10/11/2022    PROTEINTOT 7.1 10/11/2022    ALBUMIN 4.47 10/11/2022    BILITOT 0.5 10/11/2022    ALKPHOS 71 10/11/2022    AST 13 10/11/2022    ALT 10 10/11/2022          Assessment and Plan    Diagnoses and all orders for this visit:    1. Malignant neoplasm of left female breast, unspecified estrogen receptor status, unspecified site of breast (HCC) (Primary)    2. Alzheimer's dementia without behavioral disturbance (HCC)  -     Ambulatory " Referral to Palliative Care    3. Metastasis to bone (HCC)        Metastatic ER+ left breast cancer:  The patient has been tolerating Faslodex and Prolia well but we will stop these medications due to the patient's severe cognitive decline and family's wish to focus on quality of life.  I will cancel further treatments and not plan to do follow-up imaging.  The patient's sister will contact me if she has any additional questions or concerns.    Severe Alzheimer's dementia: Patient relies on her sister and other loved ones for complete care.  Her memory and ability to complete ADLs is diminishing daily.    Patient was given instructions and counseling regarding her condition or for health maintenance advice. Please see specific information pulled into the AVS if appropriate.

## 2022-10-14 NOTE — TELEPHONE ENCOUNTER
Update 10/14: [facility] and was informed they spoke with pt's sister and she does not wish to admit pt with hospice at this time and is requesting palliative care. [facility] contacted [facility] to confirm referral was received. [facility] confirmed they've already made contact with the sister and sister requested to contact them back to schedule consultation. [facility] support remains available.

## 2022-10-25 ENCOUNTER — APPOINTMENT (OUTPATIENT)
Dept: BONE DENSITY | Facility: HOSPITAL | Age: 72
End: 2022-10-25

## 2022-11-29 ENCOUNTER — OFFICE VISIT (OUTPATIENT)
Dept: NEUROLOGY | Facility: CLINIC | Age: 72
End: 2022-11-29

## 2022-11-29 VITALS
HEART RATE: 88 BPM | BODY MASS INDEX: 26.43 KG/M2 | SYSTOLIC BLOOD PRESSURE: 103 MMHG | HEIGHT: 59 IN | WEIGHT: 131.1 LBS | DIASTOLIC BLOOD PRESSURE: 82 MMHG

## 2022-11-29 DIAGNOSIS — F02.80 ALZHEIMER'S DEMENTIA WITHOUT BEHAVIORAL DISTURBANCE: Primary | ICD-10-CM

## 2022-11-29 DIAGNOSIS — G30.9 ALZHEIMER'S DEMENTIA WITHOUT BEHAVIORAL DISTURBANCE: Primary | ICD-10-CM

## 2022-11-29 PROCEDURE — 99213 OFFICE O/P EST LOW 20 MIN: CPT | Performed by: PSYCHIATRY & NEUROLOGY

## 2022-11-29 NOTE — ASSESSMENT & PLAN NOTE
I discussed with the daughter that I agree fully for her to be in a supervised nursing home facility and I will fill out necessary paperwork that is needed.  She is to continue taking memantine 10 mg twice a day.  She is to follow-up with her primary care provider in the future for refills on her memantine.

## 2022-11-29 NOTE — PROGRESS NOTES
"Chief Complaint  Follow-up    Subjective          Sweetie Huitron is a 72 y.o. female who presents to [facility]  History of Present Illness  72-year-old woman here for follow-up for Alzheimer's dementia.  Her daughter is with her.  The daughter states that she is gotten worse.  Her sleeping is better with the memantine 10 mg twice a day.  Her other activities of daily living is worse.  She eats too fast.  She puts too much in her mouth.  She chokes and spits it out.  She does not know when to go to the bathroom and she has frequent disasters.  She will not answer questions.  Sometimes she defies and will not do what is asked of her.  She does not want to bathe, shampoo hair or brush her teeth.    The daughter is wanting for assistance so the patient can stay in a nursing home facility for weeks at a time as she has difficulty in taking care of the patient.    Objective   Vital Signs:   /82 (BP Location: Left arm, Patient Position: Sitting, Cuff Size: Adult)   Pulse 88   Ht 149.9 cm (59.02\")   Wt 59.5 kg (131 lb 1.6 oz)   BMI 26.46 kg/m²     Physical Exam   Patient is nonverbal for the most part and has difficulty in following commands.  Heart is regular rhythm normal in rate.  She is not using an assistive device to walk.        Assessment and Plan  Diagnoses and all orders for this visit:    1. Alzheimer's dementia without behavioral disturbance (HCC) (Primary)  Assessment & Plan:  I discussed with the daughter that I agree fully for her to be in a supervised nursing home facility and I will fill out necessary paperwork that is needed.  She is to continue taking memantine 10 mg twice a day.  She is to follow-up with her primary care provider in the future for refills on her memantine.         Total time spent with the patient and coordinating patient care was 20 minutes.    Follow Up  No follow-ups on file.  Patient was given instructions and counseling " regarding her condition or for health maintenance advice. Please see specific information pulled into the AVS if appropriate.

## 2022-12-19 ENCOUNTER — TELEPHONE (OUTPATIENT)
Dept: NEUROLOGY | Facility: CLINIC | Age: 72
End: 2022-12-19

## 2023-03-13 ENCOUNTER — TELEPHONE (OUTPATIENT)
Dept: NEUROLOGY | Facility: CLINIC | Age: 73
End: 2023-03-13
Payer: MEDICARE

## 2023-03-13 DIAGNOSIS — G30.9 ALZHEIMER'S DEMENTIA WITHOUT BEHAVIORAL DISTURBANCE: Primary | ICD-10-CM

## 2023-03-13 DIAGNOSIS — F02.80 ALZHEIMER'S DEMENTIA WITHOUT BEHAVIORAL DISTURBANCE: Primary | ICD-10-CM

## 2023-03-13 NOTE — TELEPHONE ENCOUNTER
Caller: TORI     Relationship: SISTER    Best call back number:  [phone]    What was the call regarding: NEEDS TO GET PT IN LONG TERM CARE AND WANTED TO SEE IF DR MCKNIGHT CAN ADMIT PT FOR 3 DAYS . THEY WERE TOLD PT NEEDS TO BE IN HOSPITAL BEFORE THEY WILL TAKE HER TO LONG TERM CARE FACILITY     Do you require a callback: YES       PLEASE ADVISE.

## 2023-03-14 ENCOUNTER — APPOINTMENT (OUTPATIENT)
Dept: CT IMAGING | Facility: HOSPITAL | Age: 73
End: 2023-03-14
Payer: MEDICARE

## 2023-03-14 ENCOUNTER — APPOINTMENT (OUTPATIENT)
Dept: GENERAL RADIOLOGY | Facility: HOSPITAL | Age: 73
End: 2023-03-14
Payer: MEDICARE

## 2023-03-14 ENCOUNTER — HOSPITAL ENCOUNTER (EMERGENCY)
Facility: HOSPITAL | Age: 73
Discharge: HOME | End: 2023-03-15
Attending: EMERGENCY MEDICINE | Admitting: EMERGENCY MEDICINE
Payer: MEDICARE

## 2023-03-14 DIAGNOSIS — F03.B18 MODERATE DEMENTIA WITH OTHER BEHAVIORAL DISTURBANCE, UNSPECIFIED DEMENTIA TYPE: Primary | ICD-10-CM

## 2023-03-14 LAB
ALBUMIN SERPL-MCNC: 4.5 G/DL (ref 3.5–5.2)
ALBUMIN/GLOB SERPL: 1.3 G/DL
ALP SERPL-CCNC: 91 U/L (ref 39–117)
ALT SERPL W P-5'-P-CCNC: 13 U/L (ref 1–33)
ANION GAP SERPL CALCULATED.3IONS-SCNC: 11.8 MMOL/L (ref 5–15)
AST SERPL-CCNC: 16 U/L (ref 1–32)
BACTERIA UR QL AUTO: ABNORMAL /HPF
BASOPHILS # BLD AUTO: 0.01 10*3/MM3 (ref 0–0.2)
BASOPHILS NFR BLD AUTO: 0.2 % (ref 0–1.5)
BILIRUB SERPL-MCNC: 0.5 MG/DL (ref 0–1.2)
BILIRUB UR QL STRIP: NEGATIVE
BUN SERPL-MCNC: 14 MG/DL (ref 8–23)
BUN/CREAT SERPL: 17.3 (ref 7–25)
CALCIUM SPEC-SCNC: 10.5 MG/DL (ref 8.6–10.5)
CHLORIDE SERPL-SCNC: 100 MMOL/L (ref 98–107)
CLARITY UR: CLEAR
CO2 SERPL-SCNC: 28.2 MMOL/L (ref 22–29)
COLOR UR: YELLOW
CREAT SERPL-MCNC: 0.81 MG/DL (ref 0.57–1)
DEPRECATED RDW RBC AUTO: 44.6 FL (ref 37–54)
EGFRCR SERPLBLD CKD-EPI 2021: 77.2 ML/MIN/1.73
EOSINOPHIL # BLD AUTO: 0.07 10*3/MM3 (ref 0–0.4)
EOSINOPHIL NFR BLD AUTO: 1.6 % (ref 0.3–6.2)
ERYTHROCYTE [DISTWIDTH] IN BLOOD BY AUTOMATED COUNT: 13.4 % (ref 12.3–15.4)
FLUAV AG NPH QL: NEGATIVE
FLUBV AG NPH QL IA: NEGATIVE
GLOBULIN UR ELPH-MCNC: 3.5 GM/DL
GLUCOSE SERPL-MCNC: 104 MG/DL (ref 65–99)
GLUCOSE UR STRIP-MCNC: NEGATIVE MG/DL
HCT VFR BLD AUTO: 41 % (ref 34–46.6)
HGB BLD-MCNC: 13.8 G/DL (ref 12–15.9)
HGB UR QL STRIP.AUTO: NEGATIVE
HOLD SPECIMEN: NORMAL
HOLD SPECIMEN: NORMAL
HYALINE CASTS UR QL AUTO: ABNORMAL /LPF
IMM GRANULOCYTES # BLD AUTO: 0.01 10*3/MM3 (ref 0–0.05)
IMM GRANULOCYTES NFR BLD AUTO: 0.2 % (ref 0–0.5)
KETONES UR QL STRIP: ABNORMAL
LARGE PLATELETS: NORMAL
LEUKOCYTE ESTERASE UR QL STRIP.AUTO: ABNORMAL
LYMPHOCYTES # BLD AUTO: 2.03 10*3/MM3 (ref 0.7–3.1)
LYMPHOCYTES NFR BLD AUTO: 47.5 % (ref 19.6–45.3)
MAGNESIUM SERPL-MCNC: 2 MG/DL (ref 1.6–2.4)
MCH RBC QN AUTO: 30.5 PG (ref 26.6–33)
MCHC RBC AUTO-ENTMCNC: 33.7 G/DL (ref 31.5–35.7)
MCV RBC AUTO: 90.7 FL (ref 79–97)
MONOCYTES # BLD AUTO: 0.22 10*3/MM3 (ref 0.1–0.9)
MONOCYTES NFR BLD AUTO: 5.2 % (ref 5–12)
NEUTROPHILS NFR BLD AUTO: 1.93 10*3/MM3 (ref 1.7–7)
NEUTROPHILS NFR BLD AUTO: 45.3 % (ref 42.7–76)
NITRITE UR QL STRIP: NEGATIVE
NRBC BLD AUTO-RTO: 0 /100 WBC (ref 0–0.2)
PH UR STRIP.AUTO: 5.5 [PH] (ref 5–8)
PLATELET # BLD AUTO: 200 10*3/MM3 (ref 140–450)
PMV BLD AUTO: 10.2 FL (ref 6–12)
POTASSIUM SERPL-SCNC: 4.1 MMOL/L (ref 3.5–5.2)
PROT SERPL-MCNC: 8 G/DL (ref 6–8.5)
PROT UR QL STRIP: NEGATIVE
RBC # BLD AUTO: 4.52 10*6/MM3 (ref 3.77–5.28)
RBC # UR STRIP: ABNORMAL /HPF
RBC MORPH BLD: NORMAL
REF LAB TEST METHOD: ABNORMAL
SMALL PLATELETS BLD QL SMEAR: ADEQUATE
SODIUM SERPL-SCNC: 140 MMOL/L (ref 136–145)
SP GR UR STRIP: 1.02 (ref 1–1.03)
SQUAMOUS #/AREA URNS HPF: ABNORMAL /HPF
TROPONIN T SERPL HS-MCNC: <6 NG/L
UROBILINOGEN UR QL STRIP: ABNORMAL
WBC # UR STRIP: ABNORMAL /HPF
WBC MORPH BLD: NORMAL
WBC NRBC COR # BLD: 4.27 10*3/MM3 (ref 3.4–10.8)
WHOLE BLOOD HOLD COAG: NORMAL
WHOLE BLOOD HOLD SPECIMEN: NORMAL

## 2023-03-14 PROCEDURE — C9803 HOPD COVID-19 SPEC COLLECT: HCPCS | Performed by: EMERGENCY MEDICINE

## 2023-03-14 PROCEDURE — 85025 COMPLETE CBC W/AUTO DIFF WBC: CPT | Performed by: EMERGENCY MEDICINE

## 2023-03-14 PROCEDURE — P9612 CATHETERIZE FOR URINE SPEC: HCPCS

## 2023-03-14 PROCEDURE — 84484 ASSAY OF TROPONIN QUANT: CPT | Performed by: EMERGENCY MEDICINE

## 2023-03-14 PROCEDURE — 93005 ELECTROCARDIOGRAM TRACING: CPT | Performed by: EMERGENCY MEDICINE

## 2023-03-14 PROCEDURE — 85007 BL SMEAR W/DIFF WBC COUNT: CPT | Performed by: EMERGENCY MEDICINE

## 2023-03-14 PROCEDURE — 99284 EMERGENCY DEPT VISIT MOD MDM: CPT

## 2023-03-14 PROCEDURE — 80053 COMPREHEN METABOLIC PANEL: CPT | Performed by: EMERGENCY MEDICINE

## 2023-03-14 PROCEDURE — 87804 INFLUENZA ASSAY W/OPTIC: CPT | Performed by: EMERGENCY MEDICINE

## 2023-03-14 PROCEDURE — 71045 X-RAY EXAM CHEST 1 VIEW: CPT

## 2023-03-14 PROCEDURE — 83735 ASSAY OF MAGNESIUM: CPT | Performed by: EMERGENCY MEDICINE

## 2023-03-14 PROCEDURE — 81001 URINALYSIS AUTO W/SCOPE: CPT | Performed by: EMERGENCY MEDICINE

## 2023-03-14 PROCEDURE — U0004 COV-19 TEST NON-CDC HGH THRU: HCPCS | Performed by: EMERGENCY MEDICINE

## 2023-03-14 PROCEDURE — 70450 CT HEAD/BRAIN W/O DYE: CPT

## 2023-03-14 PROCEDURE — 36415 COLL VENOUS BLD VENIPUNCTURE: CPT | Performed by: EMERGENCY MEDICINE

## 2023-03-14 RX ORDER — SODIUM CHLORIDE 0.9 % (FLUSH) 0.9 %
10 SYRINGE (ML) INJECTION AS NEEDED
Status: DISCONTINUED | OUTPATIENT
Start: 2023-03-14 | End: 2023-03-15 | Stop reason: HOSPADM

## 2023-03-14 RX ORDER — ONDANSETRON 2 MG/ML
4 INJECTION INTRAMUSCULAR; INTRAVENOUS ONCE
Status: DISCONTINUED | OUTPATIENT
Start: 2023-03-14 | End: 2023-03-14

## 2023-03-14 RX ORDER — SUCRALFATE 1 G/1
1 TABLET ORAL ONCE
Status: DISCONTINUED | OUTPATIENT
Start: 2023-03-14 | End: 2023-03-14

## 2023-03-14 RX ORDER — FAMOTIDINE 10 MG/ML
20 INJECTION, SOLUTION INTRAVENOUS ONCE
Status: DISCONTINUED | OUTPATIENT
Start: 2023-03-14 | End: 2023-03-14

## 2023-03-14 NOTE — TELEPHONE ENCOUNTER
PATIENTS SISTER CALLED BACK TO CHECK THE STATUS OF HER MESSAGE YESTERDAY. SHE STATES THAT SHE IS DESPERATE FOR SOME HELP AND DOESN'T KNOW WHO ELSE TO GET THE HELP FROM. SHE STATES AT PATIENTS LAST OV WITH DR MCKNIGHT HE INFORMED HER THAT HE WOULD BE ABLE TO HELP     PLEASE CALL PATIENTS SISTER AND ADVISE     THANK YOU

## 2023-03-14 NOTE — TELEPHONE ENCOUNTER
PT CALLED IN AND STATED HER PCP ADVISED HER THAT THEY ARE UNABLE TO ADMIT THE PT. WARM TRANSFERRED TO [facility]ACK

## 2023-03-14 NOTE — ED PROVIDER NOTES
Time: 6:27 PM EDT  Date of encounter:  3/14/2023  Independent Historian/Clinical History and Information was obtained by:   Patient  Chief Complaint   Patient presents with   • Altered Mental Status       History is limited by: Dementia    History of Present Illness:  Patient is a 73 y.o. year old female who presents to the emergency department for evaluation of AMS. Brought in by sister who is guardian and caregiver.  Sister has noticed altered mental status for the past week.  States that she has had decreased p.o. intake over the past couple days which is not normal for her.  Denies any falls.  Has had mild cough.  No new vomiting or diarrhea. Has noticed she looks a little irritated on lower abdomen in skin fold, but no other new rashes. (Vinita Rowe PA-C provider in triage 6:28 PM EDT )     Patient presents to the emergency department after being brought here by the sister who is the caretaker.  She states that her sister used to actually be the caregiver with her father until her dementia advanced and she became unable to care for him.  She states that she is the caregiver for both of them now.  She states that her sister has had a significant decline over the last several months.  She states that the last week she has not seen her eat or drink anything.  She states that she is more confused than normal.  She states that she is basically nonverbal for the most part but is able to talk just chooses not to.  She reports that she has been incontinent of urine and stool and states that she believes her sister has forgotten she states that she has had increased roaming around the house at night.  The patient states that she was told by Dr. Gillis to bring the patient to the emergency department and let us know that she was unable to care for her any longer at home so that she may be admitted for 3 days stay in order to get into long-term care.  The patient's sister states that she is already spoken to [facility]  [employer] [facility] about getting her placed there.  She has no acute problems.  She states she has had no nausea vomiting or diarrhea.  She states she is had no recent fevers.  She has not been in any respiratory distress or had any complaints recently.      History provided by:  Caregiver and patient  History limited by:  Dementia   used: No        Patient Care Team  Primary Care Provider: Do Garvin APRN    Past Medical History:     No Known Allergies  Past Medical History:   Diagnosis Date   • Alzheimer disease (HCC)    • Breast cancer (HCC)    • Cancer (HCC)    • Memory change    • Memory loss      Past Surgical History:   Procedure Laterality Date   •  SECTION       Family History   Problem Relation Age of Onset   • Arthritis Other    • Stroke Other    • Heart disease Other        Home Medications:  Prior to Admission medications    Medication Sig Start Date End Date Taking? Authorizing Provider   calcium carbonate (OS-RAUL) 1250 (500 Ca) MG chewable tablet Chew 1 tablet Daily.    Moses Villalobos MD   cetirizine (zyrTEC) 10 MG tablet Take 10 mg by mouth Daily. 22  Moses Villalobos MD   donepezil (ARICEPT) 10 MG tablet 10 mg Every Night. 21   Moses Villalobos MD   loratadine (CLARITIN) 10 MG tablet Take 10 mg by mouth Daily.    Moses Villalobos MD   memantine (NAMENDA) 10 MG tablet Take 1 tablet twice a day starting in 4 weeks  Patient taking differently: 10 mg 2 (Two) Times a Day. 22   Geoff Gillis MD   QUEtiapine (SEROquel) 25 MG tablet 150 mg Every Night. Daughter states dose ranges from 100-150mg nightly prn. 21   Moses Villalobos MD   simvastatin (ZOCOR) 20 MG tablet 20 mg Every Night. 22   Moses Villalobos MD        Social History:   Social History     Tobacco Use   • Smoking status: Never   • Smokeless tobacco: Never   Substance Use Topics   • Alcohol use: Not Currently   • Drug use: Never         Review of  "Systems:  Review of Systems   Constitutional: Negative for chills and fever.   HENT: Negative for congestion, ear pain and sore throat.    Eyes: Negative for pain.   Respiratory: Positive for cough (VERY MILD INTERMITTENT). Negative for chest tightness, shortness of breath and wheezing.    Cardiovascular: Negative for chest pain and leg swelling.   Gastrointestinal: Negative for abdominal pain, diarrhea, nausea and vomiting.   Genitourinary: Negative for dysuria, flank pain, frequency, hematuria and urgency.        Pt is incontinent   Musculoskeletal: Negative for back pain, gait problem, joint swelling and neck pain.   Skin: Negative for pallor and rash.   Neurological: Negative for seizures and headaches.   Psychiatric/Behavioral: Positive for confusion (caregiver reports for confusion than normal).   All other systems reviewed and are negative.       Physical Exam:  /88   Pulse 98   Temp 98.3 °F (36.8 °C) (Oral)   Resp 18   Ht 152.4 cm (60\")   Wt 57.2 kg (126 lb 1.7 oz)   SpO2 94%   BMI 24.63 kg/m²     Physical Exam  Vitals and nursing note reviewed.   Constitutional:       General: She is not in acute distress.     Appearance: Normal appearance. She is well-developed. She is not ill-appearing or toxic-appearing.   HENT:      Head: Normocephalic and atraumatic.   Eyes:      General: No scleral icterus.     Pupils: Pupils are equal, round, and reactive to light.   Cardiovascular:      Rate and Rhythm: Normal rate and regular rhythm.      Pulses: Normal pulses.   Pulmonary:      Effort: Pulmonary effort is normal. No respiratory distress.      Breath sounds: Normal breath sounds. No wheezing.   Abdominal:      General: Abdomen is flat.      Palpations: Abdomen is soft.      Tenderness: There is no abdominal tenderness. There is no guarding.   Musculoskeletal:         General: No swelling or tenderness. Normal range of motion.      Cervical back: Neck supple. No rigidity.   Lymphadenopathy:      " Cervical: No cervical adenopathy.   Skin:     General: Skin is warm and dry.      Capillary Refill: Capillary refill takes less than 2 seconds.      Findings: No rash.   Neurological:      Mental Status: She is alert. Mental status is at baseline. She is disoriented.      GCS: GCS eye subscore is 4. GCS verbal subscore is 5. GCS motor subscore is 6.      Cranial Nerves: No facial asymmetry.      Motor: No weakness.   Psychiatric:         Mood and Affect: Mood normal.         Speech: Speech normal.                  Procedures:  Procedures      Medical Decision Making:      Comorbidities that affect care:    Alzheimer's., Cancer    External Notes reviewed:    None      The following orders were placed and all results were independently analyzed by me:  Orders Placed This Encounter   Procedures   • COVID-19,APTIMA PANTHER(SUBHASH),[facility]/ [facility], NP/OP SWAB IN UTM/VTM/SALINE TRANSPORT MEDIA,24 HR TAT - Swab, Nasopharynx   • Influenza Antigen, Rapid - Swab, Nasopharynx   • XR Chest 1 View   • CT Head Without Contrast   • Bradford Draw   • Comprehensive Metabolic Panel   • Single High Sensitivity Troponin T   • Magnesium   • Urinalysis With Culture If Indicated -   • CBC Auto Differential   • Scan Slide   • Urinalysis, Microscopic Only - Urine, Clean Catch   • Undress & Gown   • Continuous Pulse Oximetry   • Vital Signs   • ECG 12 Lead ED Triage Standing Order; Weak / Dizzy / AMS   • CBC & Differential   • Green Top (Gel)   • Lavender Top   • Gold Top - SST   • Light Blue Top       Medications Given in the Emergency Department:  Medications - No data to display     ED Course:    The patient was initially evaluated in the triage area where orders were placed. The patient was later dispositioned by TRACY Castaneda.      The patient was advised to stay for completion of workup which includes but is not limited to communication of labs and radiological results, reassessment and plan. The patient was advised that leaving prior  to disposition by a provider could result in critical findings that are not communicated to the patient.     ED Course as of 03/15/23 0621   Wed Mar 15, 2023   0015 I discussed with the patient's caregiver who is her sister that I have no medical reason to admit her to the hospital tonight.  I did advise her that I would pass on along her sister's information to the care coordination team and that they would be contacting her tomorrow to discuss what options she has as far as getting her into a long-term care facility.  I did tell her that perhaps she might be able to go to go to a rehab to do the 3 inpatient days since she did not qualify for any hospital days and that this may qualify her for the nursing home.  This would be something that care coordination would be better at working on placement.  The patient's sister states that she will follow-up with other avenues and did verbalize understanding of follow-up and return instructions to the ED. [TC]      ED Course User Index  [TC] Moni Orourke APRN       Labs:    Lab Results (last 24 hours)     Procedure Component Value Units Date/Time    CBC & Differential [[id number]]  (Abnormal) Collected: 03/14/23 1837    Specimen: Blood from Arm, Right Updated: 03/14/23 1925    Narrative:      The following orders were created for panel order CBC & Differential.  Procedure                               Abnormality         Status                     ---------                               -----------         ------                     CBC Auto Differential[[id number]]        Abnormal            Final result               Scan Slide[[id number]]                                       Final result                 Please view results for these tests on the individual orders.    Comprehensive Metabolic Panel [[id number]]  (Abnormal) Collected: 03/14/23 1837    Specimen: Blood from Arm, Right Updated: 03/14/23 1926     Glucose 104 mg/dL      BUN 14 mg/dL      Creatinine 0.81 mg/dL       Sodium 140 mmol/L      Potassium 4.1 mmol/L      Chloride 100 mmol/L      CO2 28.2 mmol/L      Calcium 10.5 mg/dL      Total Protein 8.0 g/dL      Albumin 4.5 g/dL      ALT (SGPT) 13 U/L      AST (SGOT) 16 U/L      Alkaline Phosphatase 91 U/L      Total Bilirubin 0.5 mg/dL      Globulin 3.5 gm/dL      A/G Ratio 1.3 g/dL      BUN/Creatinine Ratio 17.3     Anion Gap 11.8 mmol/L      eGFR 77.2 mL/min/1.73     Narrative:      GFR Normal >60  Chronic Kidney Disease <60  Kidney Failure <15    The GFR formula is only valid for adults with stable renal function between ages 18 and 70.    Single High Sensitivity Troponin T [[id number]]  (Normal) Collected: 03/14/23 1837    Specimen: Blood from Arm, Right Updated: 03/14/23 1925     HS Troponin T <6 ng/L     Narrative:      High Sensitive Troponin T Reference Range:  <10.0 ng/L- Negative Female for AMI  <15.0 ng/L- Negative Male for AMI  >=10 - Abnormal Female indicating possible myocardial injury.  >=15 - Abnormal Male indicating possible myocardial injury.   Clinicians would have to utilize clinical acumen, EKG, Troponin, and serial changes to determine if it is an Acute Myocardial Infarction or myocardial injury due to an underlying chronic condition.         Magnesium [[id number]]  (Normal) Collected: 03/14/23 1837    Specimen: Blood from Arm, Right Updated: 03/14/23 1926     Magnesium 2.0 mg/dL     CBC Auto Differential [[id number]]  (Abnormal) Collected: 03/14/23 1837    Specimen: Blood from Arm, Right Updated: 03/14/23 1925     WBC 4.27 10*3/mm3      RBC 4.52 10*6/mm3      Hemoglobin 13.8 g/dL      Hematocrit 41.0 %      MCV 90.7 fL      MCH 30.5 pg      MCHC 33.7 g/dL      RDW 13.4 %      RDW-SD 44.6 fl      MPV 10.2 fL      Platelets 200 10*3/mm3      Neutrophil % 45.3 %      Lymphocyte % 47.5 %      Monocyte % 5.2 %      Eosinophil % 1.6 %      Basophil % 0.2 %      Immature Grans % 0.2 %      Neutrophils, Absolute 1.93 10*3/mm3      Lymphocytes, Absolute 2.03  10*3/mm3      Monocytes, Absolute 0.22 10*3/mm3      Eosinophils, Absolute 0.07 10*3/mm3      Basophils, Absolute 0.01 10*3/mm3      Immature Grans, Absolute 0.01 10*3/mm3      nRBC 0.0 /100 WBC     Scan Slide [[id number]] Collected: 03/14/23 1837    Specimen: Blood from Arm, Right Updated: 03/14/23 1925     RBC Morphology Normal     WBC Morphology Normal     Platelet Estimate Adequate     Large Platelets Slight/1+    COVID-19,APTIMA PANTHER(SUBHASH),[facility], NP/OP SWAB IN UTM/VTM/SALINE TRANSPORT MEDIA,24 HR TAT - Swab, Nasopharynx [[id number]]  (Normal) Collected: 03/14/23 2008    Specimen: Swab from Nasopharynx Updated: 03/15/23 0200     COVID19 Not Detected    Narrative:      Fact sheet for providers: https://www.fda.gov/media/870153/download     Fact sheet for patients: https://www.fda.gov/media/933302/download    Test performed by RT PCR.    Influenza Antigen, Rapid - Swab, Nasopharynx [[id number]]  (Normal) Collected: 03/14/23 2008    Specimen: Swab from Nasopharynx Updated: 03/14/23 2041     Influenza A Ag, EIA Negative     Influenza B Ag, EIA Negative    Urinalysis With Culture If Indicated - Straight Cath [[id number]]  (Abnormal) Collected: 03/14/23 2114    Specimen: Urine from Straight Cath Updated: 03/14/23 2126     Color, UA Yellow     Appearance, UA Clear     pH, UA 5.5     Specific Gravity, UA 1.020     Glucose, UA Negative     Ketones, UA Trace     Bilirubin, UA Negative     Blood, UA Negative     Protein, UA Negative     Leuk Esterase, UA Trace     Nitrite, UA Negative     Urobilinogen, UA 1.0 E.U./dL    Narrative:      In absence of clinical symptoms, the presence of pyuria, bacteria, and/or nitrites on the urinalysis result does not correlate with infection.    Urinalysis, Microscopic Only - Straight Cath [[id number]]  (Abnormal) Collected: 03/14/23 2114    Specimen: Urine from Straight Cath Updated: 03/14/23 2126     RBC, UA 0-2 /HPF      WBC, UA 3-5 /HPF      Comment: Urine culture not indicated.         Bacteria, UA None Seen /HPF      Squamous Epithelial Cells, UA 0-2 /HPF      Hyaline Casts, UA 3-6 /LPF      Methodology Automated Microscopy           Imaging:    CT Head Without Contrast    Result Date: 3/14/2023  PROCEDURE: CT HEAD WO CONTRAST  COMPARISON:  None INDICATIONS: ALTERED MENTAL STATUS  PROTOCOL:   Standard imaging protocol performed    RADIATION:   DLP: 891.2 mGy*cm   MA and/or KV was adjusted to minimize radiation dose.     TECHNIQUE: After obtaining the patient's consent, CT images were obtained without non-ionic intravenous contrast material.  FINDINGS:  There is diffuse generalized atrophy.  There are mild changes of low attenuation in the periventricular white matter consistent with chronic microvascular ischemia.  There is no acute hemorrhage.  There are no abnormal extra-axial fluid collections.  The paranasal sinuses are clear.  The mastoid air cells are clear.       No acute intracranial abnormality     RUPINDER HEARD MD       Electronically Signed and Approved By: RUPINDER HEARD MD on 3/14/2023 at 20:17             XR Chest 1 View    Result Date: 3/14/2023  PROCEDURE: XR CHEST 1 VW  COMPARISON: None  INDICATIONS: WEAKNESS, SOA  FINDINGS:  There is a right-sided Yqueaq-I-Ygpb catheter with the tip in the superior vena cava.  The heart size and pulmonary vessels are normal.  The lungs are clear.       No active disease       RUPINDER HEARD MD       Electronically Signed and Approved By: RUPINDER HEARD MD on 3/14/2023 at 19:36                 Differential Diagnosis and Discussion:      Altered Mental Status: Based on the patient's signs and symptoms, differential diagnosis includes but is not limited to meningitis, stroke, sepsis, subarachnoid hemorrhage, intracranial bleeding, encephalitis, and metabolic encephalopathy.    All labs were reviewed and interpreted by me.  All X-rays were independently reviewed by me.  CT scan radiology interpretation was reviewed by me.    MDM  Number of Diagnoses  or Management Options  Moderate dementia with other behavioral disturbance, unspecified dementia type: established and worsening     Amount and/or Complexity of Data Reviewed  Clinical lab tests: reviewed  Tests in the radiology section of CPT®: reviewed  Tests in the medicine section of CPT®: reviewed    Risk of Complications, Morbidity, and/or Mortality  Presenting problems: low  Diagnostic procedures: low  Management options: low    Patient Progress  Patient progress: stable       Patient Care Considerations:    NARCOTICS: I considered prescribing opiate pain medication as an outpatient, however The patient had no complaint of pain.      Consultants/Shared Management Plan:    None    Social Determinants of Health:    Patient has presented with family members who are responsible, reliable and will ensure follow up care.      Disposition and Care Coordination:    Discharged: The patient is suitable and stable for discharge with no need for consideration of observation or admission.    I have explained the patient´s condition, diagnoses and treatment plan based on the information available to me at this time. I have answered questions and addressed any concerns. The patient has a good  understanding of the patient´s diagnosis, condition, and treatment plan as can be expected at this point. The vital signs have been stable. The patient´s condition is stable and appropriate for discharge from the emergency department.      The patient will pursue further outpatient evaluation with the primary care physician or other designated or consulting physician as outlined in the discharge instructions. They are agreeable to this plan of care and follow-up instructions have been explained in detail. The patient has received these instructions in written format and have expressed an understanding of the discharge instructions. The patient is aware that any significant change in condition or worsening of symptoms should prompt an  immediate return to this or the closest emergency department or call to 911.  I have explained discharge medications and the need for follow up with the patient/caretakers. This was also printed in the discharge instructions. Patient was discharged with the following medications and follow up:      Medication List      Changed    memantine 10 MG tablet  Commonly known as: NAMENDA  Take 1 tablet twice a day starting in 4 weeks  What changed:   · how much to take  · when to take this  · additional instructions         Do Garvin, APRN  [address] [address] [phone]      As needed       Final diagnoses:   Moderate dementia with other behavioral disturbance, unspecified dementia type        ED Disposition     ED Disposition   Discharge    Condition   Stable    Comment   --             This medical record created using voice recognition software.           Moni Orourke, TRACY  03/15/23 1095

## 2023-03-14 NOTE — TELEPHONE ENCOUNTER
Spoke with Dr. Gillis, this is not something we are able to do please have them contact PCP office.

## 2023-03-15 VITALS
WEIGHT: 126.1 LBS | SYSTOLIC BLOOD PRESSURE: 121 MMHG | BODY MASS INDEX: 24.76 KG/M2 | DIASTOLIC BLOOD PRESSURE: 88 MMHG | HEIGHT: 60 IN | OXYGEN SATURATION: 94 % | TEMPERATURE: 98.3 F | RESPIRATION RATE: 18 BRPM | HEART RATE: 98 BPM

## 2023-03-15 LAB
QT INTERVAL: 411 MS
SARS-COV-2 RNA RESP QL NAA+PROBE: NOT DETECTED

## 2023-03-15 NOTE — DISCHARGE INSTRUCTIONS
The patient information was passed along for care coordination to contact you tomorrow to talk about getting her set up for an at home assessment for nursing home placement.  All of her test results and labs today were normal.  Follow-up with TRACY Flores as needed.  Return to the emergency department for any acutely worsening confusion, any persistent vomiting, any respiratory distress or any new or worse concerns.

## 2023-03-15 NOTE — SIGNIFICANT NOTE
03/15/23 0928   Plan   Final Discharge Disposition Code 01 - home or self-care   Final Note SW received note from provider regarding pts  requesting assistance with long term placement. SW followed up with pts  this AM. Pts  has been working with [facility] regarding admission. SANDRA discussed process and sent referral to [facility] this date on behalf of pts .

## 2023-03-16 NOTE — TELEPHONE ENCOUNTER
Carin called on 03/15 & spoke with Kendal/Manager- discussed possibly sending referral to home health.